# Patient Record
Sex: FEMALE | Race: WHITE | NOT HISPANIC OR LATINO | URBAN - METROPOLITAN AREA
[De-identification: names, ages, dates, MRNs, and addresses within clinical notes are randomized per-mention and may not be internally consistent; named-entity substitution may affect disease eponyms.]

---

## 2017-04-28 ENCOUNTER — OUTPATIENT (OUTPATIENT)
Dept: OUTPATIENT SERVICES | Facility: HOSPITAL | Age: 82
LOS: 1 days | End: 2017-04-28

## 2017-11-10 ENCOUNTER — OUTPATIENT (OUTPATIENT)
Dept: OUTPATIENT SERVICES | Facility: HOSPITAL | Age: 82
LOS: 1 days | End: 2017-11-10

## 2018-05-25 ENCOUNTER — OUTPATIENT (OUTPATIENT)
Dept: OUTPATIENT SERVICES | Facility: HOSPITAL | Age: 83
LOS: 1 days | End: 2018-05-25

## 2018-07-09 ENCOUNTER — OUTPATIENT (OUTPATIENT)
Dept: OUTPATIENT SERVICES | Facility: HOSPITAL | Age: 83
LOS: 1 days | End: 2018-07-09

## 2018-09-08 PROBLEM — Z00.00 ENCOUNTER FOR PREVENTIVE HEALTH EXAMINATION: Status: ACTIVE | Noted: 2018-09-08

## 2018-10-08 ENCOUNTER — APPOINTMENT (OUTPATIENT)
Dept: CARDIOLOGY | Facility: CLINIC | Age: 83
End: 2018-10-08
Payer: MEDICARE

## 2018-10-08 VITALS — DIASTOLIC BLOOD PRESSURE: 98 MMHG | OXYGEN SATURATION: 95 % | HEART RATE: 81 BPM | SYSTOLIC BLOOD PRESSURE: 132 MMHG

## 2018-10-08 DIAGNOSIS — Z82.49 FAMILY HISTORY OF ISCHEMIC HEART DISEASE AND OTHER DISEASES OF THE CIRCULATORY SYSTEM: ICD-10-CM

## 2018-10-08 DIAGNOSIS — Z78.9 OTHER SPECIFIED HEALTH STATUS: ICD-10-CM

## 2018-10-08 PROCEDURE — 99205 OFFICE O/P NEW HI 60 MIN: CPT

## 2018-10-08 PROCEDURE — 93000 ELECTROCARDIOGRAM COMPLETE: CPT

## 2018-10-08 RX ORDER — PRAVASTATIN SODIUM 40 MG/1
40 TABLET ORAL
Refills: 0 | Status: ACTIVE | COMMUNITY

## 2018-10-08 RX ORDER — FLUTICASONE PROPIONATE 50 UG/1
50 SPRAY, METERED NASAL
Refills: 0 | Status: ACTIVE | COMMUNITY

## 2018-10-12 PROCEDURE — 93224 XTRNL ECG REC UP TO 48 HRS: CPT

## 2018-10-29 ENCOUNTER — APPOINTMENT (OUTPATIENT)
Dept: CARDIOLOGY | Facility: CLINIC | Age: 83
End: 2018-10-29
Payer: MEDICARE

## 2018-10-29 PROCEDURE — 93015 CV STRESS TEST SUPVJ I&R: CPT

## 2018-10-29 PROCEDURE — A9502: CPT

## 2018-10-29 PROCEDURE — 78452 HT MUSCLE IMAGE SPECT MULT: CPT

## 2018-10-29 PROCEDURE — 93306 TTE W/DOPPLER COMPLETE: CPT

## 2018-11-05 ENCOUNTER — APPOINTMENT (OUTPATIENT)
Dept: CARDIOLOGY | Facility: CLINIC | Age: 83
End: 2018-11-05
Payer: MEDICARE

## 2018-11-05 VITALS
BODY MASS INDEX: 22.33 KG/M2 | SYSTOLIC BLOOD PRESSURE: 148 MMHG | DIASTOLIC BLOOD PRESSURE: 88 MMHG | HEIGHT: 65 IN | WEIGHT: 134 LBS

## 2018-11-05 PROCEDURE — 99214 OFFICE O/P EST MOD 30 MIN: CPT

## 2018-12-20 ENCOUNTER — OUTPATIENT (OUTPATIENT)
Dept: OUTPATIENT SERVICES | Facility: HOSPITAL | Age: 83
LOS: 1 days | End: 2018-12-20

## 2019-02-23 ENCOUNTER — TRANSCRIPTION ENCOUNTER (OUTPATIENT)
Age: 84
End: 2019-02-23

## 2019-05-06 ENCOUNTER — APPOINTMENT (OUTPATIENT)
Dept: CARDIOLOGY | Facility: CLINIC | Age: 84
End: 2019-05-06

## 2019-06-19 ENCOUNTER — OUTPATIENT (OUTPATIENT)
Dept: OUTPATIENT SERVICES | Facility: HOSPITAL | Age: 84
LOS: 1 days | End: 2019-06-19

## 2019-06-21 ENCOUNTER — APPOINTMENT (OUTPATIENT)
Dept: RADIOLOGY | Facility: CLINIC | Age: 84
End: 2019-06-21

## 2019-06-21 ENCOUNTER — APPOINTMENT (OUTPATIENT)
Dept: MAMMOGRAPHY | Facility: CLINIC | Age: 84
End: 2019-06-21
Payer: MEDICARE

## 2019-06-21 PROCEDURE — 77063 BREAST TOMOSYNTHESIS BI: CPT

## 2019-06-21 PROCEDURE — 77080 DXA BONE DENSITY AXIAL: CPT

## 2019-06-21 PROCEDURE — 77067 SCR MAMMO BI INCL CAD: CPT

## 2019-09-22 ENCOUNTER — TRANSCRIPTION ENCOUNTER (OUTPATIENT)
Age: 84
End: 2019-09-22

## 2019-10-11 ENCOUNTER — OUTPATIENT (OUTPATIENT)
Dept: OUTPATIENT SERVICES | Facility: HOSPITAL | Age: 84
LOS: 1 days | End: 2019-10-11

## 2020-01-23 ENCOUNTER — TRANSCRIPTION ENCOUNTER (OUTPATIENT)
Age: 85
End: 2020-01-23

## 2020-02-05 ENCOUNTER — OUTPATIENT (OUTPATIENT)
Dept: OUTPATIENT SERVICES | Facility: HOSPITAL | Age: 85
LOS: 1 days | End: 2020-02-05

## 2020-09-25 ENCOUNTER — TRANSCRIPTION ENCOUNTER (OUTPATIENT)
Age: 85
End: 2020-09-25

## 2020-10-09 ENCOUNTER — OUTPATIENT (OUTPATIENT)
Dept: OUTPATIENT SERVICES | Facility: HOSPITAL | Age: 85
LOS: 1 days | End: 2020-10-09

## 2021-05-07 ENCOUNTER — OUTPATIENT (OUTPATIENT)
Dept: OUTPATIENT SERVICES | Facility: HOSPITAL | Age: 86
LOS: 1 days | End: 2021-05-07

## 2021-11-05 ENCOUNTER — OUTPATIENT (OUTPATIENT)
Dept: OUTPATIENT SERVICES | Facility: HOSPITAL | Age: 86
LOS: 1 days | End: 2021-11-05

## 2021-12-14 ENCOUNTER — OUTPATIENT (OUTPATIENT)
Dept: OUTPATIENT SERVICES | Facility: HOSPITAL | Age: 86
LOS: 1 days | End: 2021-12-14

## 2022-02-10 ENCOUNTER — NON-APPOINTMENT (OUTPATIENT)
Age: 87
End: 2022-02-10

## 2022-02-10 ENCOUNTER — APPOINTMENT (OUTPATIENT)
Dept: OPHTHALMOLOGY | Facility: CLINIC | Age: 87
End: 2022-02-10
Payer: MEDICARE

## 2022-02-10 PROCEDURE — 92014 COMPRE OPH EXAM EST PT 1/>: CPT

## 2022-02-10 PROCEDURE — 92134 CPTRZ OPH DX IMG PST SGM RTA: CPT

## 2022-05-07 ENCOUNTER — INPATIENT (INPATIENT)
Facility: HOSPITAL | Age: 87
LOS: 5 days | Discharge: HOME | DRG: 964 | End: 2022-05-13
Attending: HOSPITALIST | Admitting: HOSPITALIST
Payer: MEDICARE

## 2022-05-07 ENCOUNTER — EMERGENCY (EMERGENCY)
Facility: HOSPITAL | Age: 87
LOS: 1 days | Discharge: HOME | End: 2022-05-07
Admitting: EMERGENCY MEDICINE
Payer: MEDICARE

## 2022-05-07 VITALS
DIASTOLIC BLOOD PRESSURE: 60 MMHG | HEART RATE: 91 BPM | SYSTOLIC BLOOD PRESSURE: 150 MMHG | TEMPERATURE: 97 F | RESPIRATION RATE: 18 BRPM

## 2022-05-07 DIAGNOSIS — G93.41 METABOLIC ENCEPHALOPATHY: ICD-10-CM

## 2022-05-07 DIAGNOSIS — Z98.890 OTHER SPECIFIED POSTPROCEDURAL STATES: Chronic | ICD-10-CM

## 2022-05-07 DIAGNOSIS — Z96.659 PRESENCE OF UNSPECIFIED ARTIFICIAL KNEE JOINT: Chronic | ICD-10-CM

## 2022-05-07 LAB
ALBUMIN SERPL ELPH-MCNC: 4.2 G/DL — SIGNIFICANT CHANGE UP (ref 3.3–5.2)
ALP SERPL-CCNC: 81 U/L — SIGNIFICANT CHANGE UP (ref 40–120)
ALT FLD-CCNC: 19 U/L — SIGNIFICANT CHANGE UP
AMPHET UR-MCNC: NEGATIVE — SIGNIFICANT CHANGE UP
ANION GAP SERPL CALC-SCNC: 13 MMOL/L — SIGNIFICANT CHANGE UP (ref 5–17)
APTT BLD: 27.7 SEC — SIGNIFICANT CHANGE UP (ref 27.5–35.5)
AST SERPL-CCNC: 24 U/L — SIGNIFICANT CHANGE UP
BARBITURATES UR SCN-MCNC: NEGATIVE — SIGNIFICANT CHANGE UP
BASOPHILS # BLD AUTO: 0.05 K/UL — SIGNIFICANT CHANGE UP (ref 0–0.2)
BASOPHILS NFR BLD AUTO: 0.5 % — SIGNIFICANT CHANGE UP (ref 0–2)
BENZODIAZ UR-MCNC: NEGATIVE — SIGNIFICANT CHANGE UP
BILIRUB SERPL-MCNC: 0.3 MG/DL — LOW (ref 0.4–2)
BLD GP AB SCN SERPL QL: SIGNIFICANT CHANGE UP
BUN SERPL-MCNC: 19.7 MG/DL — SIGNIFICANT CHANGE UP (ref 8–20)
CALCIUM SERPL-MCNC: 9.4 MG/DL — SIGNIFICANT CHANGE UP (ref 8.6–10.2)
CHLORIDE SERPL-SCNC: 108 MMOL/L — HIGH (ref 98–107)
CO2 SERPL-SCNC: 21 MMOL/L — LOW (ref 22–29)
COCAINE METAB.OTHER UR-MCNC: NEGATIVE — SIGNIFICANT CHANGE UP
CREAT SERPL-MCNC: 0.63 MG/DL — SIGNIFICANT CHANGE UP (ref 0.5–1.3)
EGFR: 87 ML/MIN/1.73M2 — SIGNIFICANT CHANGE UP
EOSINOPHIL # BLD AUTO: 0.01 K/UL — SIGNIFICANT CHANGE UP (ref 0–0.5)
EOSINOPHIL NFR BLD AUTO: 0.1 % — SIGNIFICANT CHANGE UP (ref 0–6)
ETHANOL SERPL-MCNC: <10 MG/DL — SIGNIFICANT CHANGE UP (ref 0–9)
GLUCOSE SERPL-MCNC: 181 MG/DL — HIGH (ref 70–99)
HCT VFR BLD CALC: 48.1 % — HIGH (ref 34.5–45)
HGB BLD-MCNC: 15 G/DL — SIGNIFICANT CHANGE UP (ref 11.5–15.5)
IMM GRANULOCYTES NFR BLD AUTO: 0.4 % — SIGNIFICANT CHANGE UP (ref 0–1.5)
LYMPHOCYTES # BLD AUTO: 0.62 K/UL — LOW (ref 1–3.3)
LYMPHOCYTES # BLD AUTO: 5.9 % — LOW (ref 13–44)
MCHC RBC-ENTMCNC: 27.6 PG — SIGNIFICANT CHANGE UP (ref 27–34)
MCHC RBC-ENTMCNC: 31.2 GM/DL — LOW (ref 32–36)
MCV RBC AUTO: 88.6 FL — SIGNIFICANT CHANGE UP (ref 80–100)
METHADONE UR-MCNC: NEGATIVE — SIGNIFICANT CHANGE UP
MONOCYTES # BLD AUTO: 0.32 K/UL — SIGNIFICANT CHANGE UP (ref 0–0.9)
MONOCYTES NFR BLD AUTO: 3 % — SIGNIFICANT CHANGE UP (ref 2–14)
NEUTROPHILS # BLD AUTO: 9.48 K/UL — HIGH (ref 1.8–7.4)
NEUTROPHILS NFR BLD AUTO: 90.1 % — HIGH (ref 43–77)
OPIATES UR-MCNC: NEGATIVE — SIGNIFICANT CHANGE UP
PCP SPEC-MCNC: SIGNIFICANT CHANGE UP
PCP UR-MCNC: NEGATIVE — SIGNIFICANT CHANGE UP
PLATELET # BLD AUTO: 194 K/UL — SIGNIFICANT CHANGE UP (ref 150–400)
POTASSIUM SERPL-MCNC: 3.8 MMOL/L — SIGNIFICANT CHANGE UP (ref 3.5–5.3)
POTASSIUM SERPL-SCNC: 3.8 MMOL/L — SIGNIFICANT CHANGE UP (ref 3.5–5.3)
PROT SERPL-MCNC: 7 G/DL — SIGNIFICANT CHANGE UP (ref 6.6–8.7)
RBC # BLD: 5.43 M/UL — HIGH (ref 3.8–5.2)
RBC # FLD: 13 % — SIGNIFICANT CHANGE UP (ref 10.3–14.5)
SODIUM SERPL-SCNC: 142 MMOL/L — SIGNIFICANT CHANGE UP (ref 135–145)
THC UR QL: NEGATIVE — SIGNIFICANT CHANGE UP
WBC # BLD: 10.52 K/UL — HIGH (ref 3.8–10.5)
WBC # FLD AUTO: 10.52 K/UL — HIGH (ref 3.8–10.5)

## 2022-05-07 PROCEDURE — 72125 CT NECK SPINE W/O DYE: CPT | Mod: 26,MH

## 2022-05-07 PROCEDURE — 93010 ELECTROCARDIOGRAM REPORT: CPT

## 2022-05-07 PROCEDURE — 71260 CT THORAX DX C+: CPT | Mod: 26,MA

## 2022-05-07 PROCEDURE — 70450 CT HEAD/BRAIN W/O DYE: CPT | Mod: 26,MH

## 2022-05-07 PROCEDURE — 0042T: CPT

## 2022-05-07 PROCEDURE — 70486 CT MAXILLOFACIAL W/O DYE: CPT | Mod: 26,MH

## 2022-05-07 PROCEDURE — 74177 CT ABD & PELVIS W/CONTRAST: CPT | Mod: 26,MA

## 2022-05-07 PROCEDURE — 71045 X-RAY EXAM CHEST 1 VIEW: CPT | Mod: 26

## 2022-05-07 PROCEDURE — 70498 CT ANGIOGRAPHY NECK: CPT | Mod: 26,MG

## 2022-05-07 PROCEDURE — 99285 EMERGENCY DEPT VISIT HI MDM: CPT | Mod: FS

## 2022-05-07 PROCEDURE — 99291 CRITICAL CARE FIRST HOUR: CPT

## 2022-05-07 PROCEDURE — 70496 CT ANGIOGRAPHY HEAD: CPT | Mod: 26,ME

## 2022-05-07 PROCEDURE — G1004: CPT

## 2022-05-07 RX ORDER — ONDANSETRON 8 MG/1
4 TABLET, FILM COATED ORAL EVERY 6 HOURS
Refills: 0 | Status: DISCONTINUED | OUTPATIENT
Start: 2022-05-07 | End: 2022-05-13

## 2022-05-07 RX ORDER — SODIUM CHLORIDE 9 MG/ML
1000 INJECTION INTRAMUSCULAR; INTRAVENOUS; SUBCUTANEOUS
Refills: 0 | Status: DISCONTINUED | OUTPATIENT
Start: 2022-05-07 | End: 2022-05-08

## 2022-05-07 RX ORDER — ACETAMINOPHEN 500 MG
1000 TABLET ORAL ONCE
Refills: 0 | Status: COMPLETED | OUTPATIENT
Start: 2022-05-07 | End: 2022-05-07

## 2022-05-07 RX ORDER — LABETALOL HCL 100 MG
10 TABLET ORAL EVERY 6 HOURS
Refills: 0 | Status: DISCONTINUED | OUTPATIENT
Start: 2022-05-07 | End: 2022-05-08

## 2022-05-07 RX ORDER — HYDRALAZINE HCL 50 MG
10 TABLET ORAL EVERY 6 HOURS
Refills: 0 | Status: DISCONTINUED | OUTPATIENT
Start: 2022-05-07 | End: 2022-05-08

## 2022-05-07 RX ORDER — NICARDIPINE HYDROCHLORIDE 30 MG/1
5 CAPSULE, EXTENDED RELEASE ORAL
Qty: 40 | Refills: 0 | Status: DISCONTINUED | OUTPATIENT
Start: 2022-05-07 | End: 2022-05-08

## 2022-05-07 RX ADMIN — Medication 400 MILLIGRAM(S): at 17:22

## 2022-05-07 RX ADMIN — SODIUM CHLORIDE 75 MILLILITER(S): 9 INJECTION INTRAMUSCULAR; INTRAVENOUS; SUBCUTANEOUS at 19:38

## 2022-05-07 RX ADMIN — NICARDIPINE HYDROCHLORIDE 25 MG/HR: 30 CAPSULE, EXTENDED RELEASE ORAL at 17:23

## 2022-05-07 NOTE — ED PROVIDER NOTE - CLINICAL SUMMARY MEDICAL DECISION MAKING FREE TEXT BOX
labs and imaging reviewed. Pt cleared by trauma.  neurosurgery recommends CTA/P which was ordered.  will admit to neurosurgery for further management.

## 2022-05-07 NOTE — ED ADULT NURSE REASSESSMENT NOTE - NS ED NURSE REASSESS COMMENT FT1
Report received from CC AJAY Meyers. Pt received on cardene gtt @ 5mcg. Cardene gtt paused @ this time, see BP in flow sheet. Pt presents with R eye ecchymosis w/ blood to the slcera. Pt c/o slight HA @ this time. Denies of HA, CP, SOB, dizziness, lightheadedness, NVD. Respirations even & unlabored. NAD present. Pt remains on CM in NSR w/. Pt received with 16 fr Indwelling garcia catheter. Output of 300cc when pt received. Pt awaiting bed @ this time. Pt made aware of plan of care and verbalized understanding.

## 2022-05-07 NOTE — ED ADULT NURSE NOTE - OBJECTIVE STATEMENT
transfer from [facility] [place]  assumed care of pt in trauma bay. see trauma flow sheet.

## 2022-05-07 NOTE — ED ADULT NURSE REASSESSMENT NOTE - NS ED NURSE REASSESS COMMENT FT1
Bedside  ABD US done by Dr. Pineda > 400 ml urine in the bladder . Neuro ICU PA called and made aware - awaiting additional orders - will continue to monitor

## 2022-05-07 NOTE — H&P ADULT - HISTORY OF PRESENT ILLNESS
HPI: Patient is a 86y old  Female with no PMH transferred from [facility] [place] after fall while pushing a chair with + LOC and HS and evidence of SDH. On Arrival patient alert and oriented, responding appropriately without complaints.   Reports living alone and walks without assistance, takes no medications.     Primary Survey:    A - airway intact  B - bilateral breath sounds and good chest rise  C - palpable pulses in all extremities  D - GCS 15 on arrival  Exposure obtained    CXR: No acute traumatic injury MRs: 0  NIH 1    Patient is a 86y old  Female who presents with a chief complaint of a fall    HPI:  85 yo female with a pmhx of HTN and HGLD presents as a transfer from [facility] [place] with reports of a fall while at [place] pushing a chair. Patient states that the last thing she remembers is trying to move the chair and then she woke up on the floor. She states prior to the fall she felt fine, denied any headaches, chest pain or shortness of breathe. (+)HS  (+) LOC. Patient reports a mild headache, trouble speaking but denies any neck pain, numbness, tingling or weakness in her extremities.     Of note son at bedside also reporting a change in patients speech    PAST MEDICAL & SURGICAL HISTORY:  HTN    HLD    H/O hand surgery    H/o of Left knee replacement    SOCIAL HISTORY:  Tobacco Use: Denies   EtOH use: Denies  Substance: Denies    Allergies    No Known Allergies    Intolerances    Vital Signs Last 24 Hrs  T(C): 36.1 (07 May 2022 17:20), Max: 36.1 (07 May 2022 17:20)  T(F): 97 (07 May 2022 17:20), Max: 97 (07 May 2022 17:20)  HR: 78 (07 May 2022 18:01) (78 - 91)  BP: 128/65 (07 May 2022 18:01) (128/65 - 150/60)  BP(mean): --  RR: 18 (07 May 2022 18:01) (18 - 18)  SpO2: 98% (07 May 2022 18:01) (98% - 98%)

## 2022-05-07 NOTE — H&P ADULT - NSHPLABSRESULTS_GEN_ALL_CORE
LABS:                        15.0   10.52 )-----------( 194      ( 07 May 2022 17:09 )             48.1     05-07    142  |  108<H>  |  19.7  ----------------------------<  181<H>  3.8   |  21.0<L>  |  0.63    Ca    9.4      07 May 2022 17:09    TPro  7.0  /  Alb  4.2  /  TBili  0.3<L>  /  DBili  x   /  AST  24  /  ALT  19  /  AlkPhos  81  05-07    PTT - ( 07 May 2022 17:09 )  PTT:27.7 sec      RADIOLOGY & ADDITIONAL STUDIES:    5/7 CT Head at [place]: Right Parietal Extracalvarial hematoma, No fracture of the underlying calvarium. Small left frontal and left tentorial Subdural hematoma. Left Posterior Fossa hygroma. Minimal fluid within the inferiorly located mastoid air cells on the right  5/7 CT C-Spine: No acture. Grade 1 Subluxation of C6-C7with aterolisthesis of C6 on C7    CAPRINI SCORE [CLOT]:  Patient has an estimated Caprini score of greater than 5.  However, the patient's unique clinical situation will be addressed in an individual manner to determine appropriate anticoagulation treatment, if any

## 2022-05-07 NOTE — ED ADULT NURSE REASSESSMENT NOTE - NS ED NURSE REASSESS COMMENT FT1
pt started on cardene drip upon trauma activation. cardene at 5 mg/hr. pt anox3. chronic deformity noted on right elbow. right eye is swollen and bruised, unable to assess pupils in right eye. pt c/o 10/10 sharp HA on right side. md vasquez made aware. medication administered as per orders. rr even and unlabored on RA. placed on cardiac monitor and prima fit in place. pt also c/o of abdominal pain. abdomen is distended and tender, icu pa made aware.  juliane villeda at bedside for evaluation.

## 2022-05-07 NOTE — ED PROVIDER NOTE - NS ED ROS FT
No fever/chills   No photophobia/eye pain/changes in visio,   No ear pain/sore throat/dysphagia   No chest pain/palpitations  No SOB/cough/wheeze/stridor   No abdominal pain, No N/V/D  No dysuria/frequency/discharge   No neck/back pain,   No rash  No changes in neurological status/function.

## 2022-05-07 NOTE — H&P ADULT - NSICDXPASTSURGICALHX_GEN_ALL_CORE_FT
PAST SURGICAL HISTORY:  H/O hand surgery      PAST SURGICAL HISTORY:  H/O hand surgery       S/P knee replacement

## 2022-05-07 NOTE — ED ADULT TRIAGE NOTE - CHIEF COMPLAINT QUOTE
Pt arrives awake alert and oriented from [facility] [place] for SDH s/p trip and fall at home. Pt at baseline mentation and has Cardene infusion running. Trauma B initiated.

## 2022-05-07 NOTE — ED PROVIDER NOTE - PHYSICAL EXAMINATION
Constitutional - well-developed.   Head - R periorbital hematoma. Airway patent.   Eyes - PERRL.   CV - RRR. no murmur. no edema.   Pulm - CTAB.   Abd - soft, nt. no rebound. no guarding.   Neuro - A&Ox3. strength 5/5 x4. sensation intact x4. normal gait.   Skin - No rash. .  MSK - normal ROM.

## 2022-05-07 NOTE — H&P ADULT - NSICDXPASTMEDICALHX_GEN_ALL_CORE_FT
PAST MEDICAL HISTORY:  No pertinent past medical history PAST MEDICAL HISTORY:  HLD (hyperlipidemia)     HTN (hypertension)

## 2022-05-07 NOTE — H&P ADULT - NSHPPHYSICALEXAM_GEN_ALL_CORE
PHYSICAL EXAM:   General: NAD, Lying in bed comfortably  Neuro: A+Ox3  HEENT: R posterior scalp hematoma, R periorbital ecchymosis, EOMI, R eye conjunctival injection, midface stable.   Neck: Soft, supple  Cardio: RRR, nml S1/S2  Resp: Good effort, CTA b/l  Thorax: No chest wall tenderness  Breast: No lesions/masses, no drainage  GI/Abd: Soft, NT/ND, no rebound/guarding, no masses palpated  Vascular: All 4 extremities warm.  Skin: Intact, no breakdown  Pelvis: stable  Musculoskeletal: All 4 extremities moving spontaneously, no limitations, no cervical-thoracic spinal tenderness or stepoffs. Lumbar spine/paraspinal tenderness PHYSICAL EXAM:  GENERAL: NAD, well-groomed, well-developed  HEAD:  (+) Soft tissue swelling to the right temporparietal area   EYES: (+) Right eye ree-orbital ecchymosis, (+) Conjuctival hemorrhage, Pupils 3mmR, EOMI  ENMT: No tonsillar erythema, exudates, or enlargement; moist mucous membranes, good dentition, no lesions  NECK: Supple, No tenderness to palpation, (+) Pain with cervical bending and lateral rotation to the right  MENTAL STATUS: AAO x3; Awake, Opens eyes spontaneously, Following simple commands, Mild expressive aphasia and word finding difficulty  CRANIAL NERVES: PERRL. EOMI without nystagmus. No signs of entrapment, Facial sensation intact V1-3 distribution b/l. Face symmetric w/ normal eye closure and smile, tongue midline. Speech clear  REFLEXES: PERRL  MOTOR: strength 5/5 b/l upper and lower extremities  Uppers     Delt (C5/6)     Bicep (C5/6)     Wrist Extend (C6)     Tricep (C7)     HG (C8/T1)  R                     5/5                 5/5                         5/5                           5/5                   5/5  L                      5/5                 5/5                         5/5                           5/5                   5/5  Lowers      HF(L1/L2)     KE (L3)     DF (L4)     EHL (L5)     PF (S1)      R                     5/5              5/5           5/5           5/5            5/5  L                     5/5               5/5          5/5            5/5            5/5  SENSATION: grossly intact to light touch all extremities  MUSCLE STRETCH REFLEXES: DTRs 2+ intact and symmetric, No tenderness to palpation of spinous processes of the cervical, thoracic and lumbar spine  CHEST/LUNG: Clear to auscultation bilaterally; no rales, rhonchi, wheezing, or rubs  HEART: +S1/+S2  ABDOMEN: Soft, nontender, Mildly distended  EXTREMITIES:  2+ peripheral pulses  LYMPH: No lymphadenopathy noted  SKIN: Warm, dry; no rashes or lesions

## 2022-05-07 NOTE — ED ADULT NURSE REASSESSMENT NOTE - NS ED NURSE REASSESS COMMENT FT1
report  given to ben lee at 17:55. rr even and unlabored. continued cardiac monitor in place. pt educated on plan of care, pt able to successfully teach back plan of care to RN, RN will continue to reeducate pt during hospital stay.

## 2022-05-07 NOTE — H&P ADULT - NSHPREVIEWOFSYSTEMS_GEN_ALL_CORE
REVIEW OF SYSTEMS  Negative except as noted in HPI  CONSTITUTIONAL: No fever, weight loss, or fatigue  EYES: No eye pain, visual disturbances, or discharge  ENMT:  No difficulty hearing, tinnitus, vertigo; No sinus or throat pain  NECK: No pain or stiffness  BREASTS: No pain, masses, or nipple discharge  RESPIRATORY: No cough, wheezing, chills or hemoptysis; No shortness of breath  CARDIOVASCULAR: No chest pain, palpitations, dizziness, or leg swelling  GASTROINTESTINAL: No abdominal or epigastric pain. No nausea, vomiting, or hematemesis; No diarrhea or constipation. No melena or hematochezia.  GENITOURINARY: No dysuria, frequency, hematuria, or incontinence  NEUROLOGICAL: (+) Mild headache, memory loss, and difficulty speaking but denies loss of strength, numbness, or tremors  SKIN: No itching, burning, rashes, or lesions   LYMPH NODES: No enlarged glands  ENDOCRINE: No heat or cold intolerance; No hair loss  MUSCULOSKELETAL: No joint pain or swelling; No muscle, back, or extremity pain  PSYCHIATRIC: No depression, anxiety, mood swings, or difficulty sleeping  HEME/LYMPH: No easy bruising, or bleeding gums  ALLERGY AND IMMUNOLOGIC: No hives or eczema

## 2022-05-07 NOTE — H&P ADULT - ATTENDING COMMENTS
87 yo female with a pmhx of HTN and HLD who had a GLF and was found to have a Left frontal and left tentorial SDH and Grade 1 subluxation of C6/7  Awake alert  hemodynamic intact, slight HTN  Bilateral BS  Abdomen soft  Plan:    - Place C-Collar  - Admit to Neurosurgery Dr. Cartagena with Q2 hours neurochecks  -? New expressive aphasia: CTA head and Neck and CTP  - MRI C-Spine  -Case and plan discussed with Dr. Cartagena attending on call

## 2022-05-07 NOTE — ED ADULT NURSE NOTE - CHIEF COMPLAINT QUOTE
Pt arrives awake alert and oriented from [facility] [place] for [place]H s/p trip and fall at home. Pt at baseline mentation and has Cardene infusion running. Trauma B initiated.

## 2022-05-07 NOTE — H&P ADULT - ASSESSMENT
ASSESSMENT: Patient is a 87 y/o F  s/p GLF with LOC and HS and SDH. Patient also complaints of lumbar tenderness.    PLAN:    - C collar cleared clinically and with imaging from [facility]  - f/u NSx  - NPO  - BP control  - pain control  - strict bedrest  - strict I/Os  - F/U CT CAP, if negative for traumatic injuries, patinet would be cleared for admission to Neurosurgery  - Patient seen/examined with attending.  - Plan to be discussed with Attending, Dr. Keating 85 yo female with a pmhx of HTN and HLD who had a GLF and was found to have a Left frontal and left tentorial SDH and Grade 1 subluxation of C6/7    Plan:    - Place C-Collar  - Admit to Neurosurgery Dr. Cartagena with Q2 hours neurochecks  -? New expressive aphasia: CTA head and Neck and CTP  - MRI C-Spine  -Case and plan discussed with Dr. Cartagena attending on call

## 2022-05-08 DIAGNOSIS — I62.9 NONTRAUMATIC INTRACRANIAL HEMORRHAGE, UNSPECIFIED: ICD-10-CM

## 2022-05-08 DIAGNOSIS — I10 ESSENTIAL (PRIMARY) HYPERTENSION: ICD-10-CM

## 2022-05-08 DIAGNOSIS — E78.5 HYPERLIPIDEMIA, UNSPECIFIED: ICD-10-CM

## 2022-05-08 DIAGNOSIS — R55 SYNCOPE AND COLLAPSE: ICD-10-CM

## 2022-05-08 LAB
A1C WITH ESTIMATED AVERAGE GLUCOSE RESULT: 6.1 % — HIGH (ref 4–5.6)
ANION GAP SERPL CALC-SCNC: 12 MMOL/L — SIGNIFICANT CHANGE UP (ref 5–17)
BASOPHILS # BLD AUTO: 0 K/UL — SIGNIFICANT CHANGE UP (ref 0–0.2)
BASOPHILS NFR BLD AUTO: 0 % — SIGNIFICANT CHANGE UP (ref 0–2)
BUN SERPL-MCNC: 15.3 MG/DL — SIGNIFICANT CHANGE UP (ref 8–20)
CALCIUM SERPL-MCNC: 8.8 MG/DL — SIGNIFICANT CHANGE UP (ref 8.6–10.2)
CHLORIDE SERPL-SCNC: 112 MMOL/L — HIGH (ref 98–107)
CHOLEST SERPL-MCNC: 135 MG/DL — SIGNIFICANT CHANGE UP
CO2 SERPL-SCNC: 21 MMOL/L — LOW (ref 22–29)
CREAT SERPL-MCNC: 0.51 MG/DL — SIGNIFICANT CHANGE UP (ref 0.5–1.3)
EGFR: 92 ML/MIN/1.73M2 — SIGNIFICANT CHANGE UP
EOSINOPHIL # BLD AUTO: 0 K/UL — SIGNIFICANT CHANGE UP (ref 0–0.5)
EOSINOPHIL NFR BLD AUTO: 0 % — SIGNIFICANT CHANGE UP (ref 0–6)
ESTIMATED AVERAGE GLUCOSE: 128 MG/DL — HIGH (ref 68–114)
GIANT PLATELETS BLD QL SMEAR: PRESENT — SIGNIFICANT CHANGE UP
GLUCOSE BLDC GLUCOMTR-MCNC: 113 MG/DL — HIGH (ref 70–99)
GLUCOSE BLDC GLUCOMTR-MCNC: 119 MG/DL — HIGH (ref 70–99)
GLUCOSE BLDC GLUCOMTR-MCNC: 123 MG/DL — HIGH (ref 70–99)
GLUCOSE BLDC GLUCOMTR-MCNC: 158 MG/DL — HIGH (ref 70–99)
GLUCOSE SERPL-MCNC: 177 MG/DL — HIGH (ref 70–99)
HCT VFR BLD CALC: 40.8 % — SIGNIFICANT CHANGE UP (ref 34.5–45)
HDLC SERPL-MCNC: 51 MG/DL — SIGNIFICANT CHANGE UP
HGB BLD-MCNC: 12.9 G/DL — SIGNIFICANT CHANGE UP (ref 11.5–15.5)
INR BLD: 1.09 RATIO — SIGNIFICANT CHANGE UP (ref 0.88–1.16)
LIPID PNL WITH DIRECT LDL SERPL: 72 MG/DL — SIGNIFICANT CHANGE UP
LYMPHOCYTES # BLD AUTO: 0.27 K/UL — LOW (ref 1–3.3)
LYMPHOCYTES # BLD AUTO: 3.5 % — LOW (ref 13–44)
MAGNESIUM SERPL-MCNC: 2 MG/DL — SIGNIFICANT CHANGE UP (ref 1.6–2.6)
MANUAL SMEAR VERIFICATION: SIGNIFICANT CHANGE UP
MCHC RBC-ENTMCNC: 28.4 PG — SIGNIFICANT CHANGE UP (ref 27–34)
MCHC RBC-ENTMCNC: 31.6 GM/DL — LOW (ref 32–36)
MCV RBC AUTO: 89.9 FL — SIGNIFICANT CHANGE UP (ref 80–100)
MONOCYTES # BLD AUTO: 0.66 K/UL — SIGNIFICANT CHANGE UP (ref 0–0.9)
MONOCYTES NFR BLD AUTO: 8.7 % — SIGNIFICANT CHANGE UP (ref 2–14)
NEUTROPHILS # BLD AUTO: 6.7 K/UL — SIGNIFICANT CHANGE UP (ref 1.8–7.4)
NEUTROPHILS NFR BLD AUTO: 86.9 % — HIGH (ref 43–77)
NEUTS BAND # BLD: 0.9 % — SIGNIFICANT CHANGE UP (ref 0–8)
NON HDL CHOLESTEROL: 84 MG/DL — SIGNIFICANT CHANGE UP
PHOSPHATE SERPL-MCNC: 2.8 MG/DL — SIGNIFICANT CHANGE UP (ref 2.4–4.7)
PLAT MORPH BLD: NORMAL — SIGNIFICANT CHANGE UP
PLATELET # BLD AUTO: 195 K/UL — SIGNIFICANT CHANGE UP (ref 150–400)
POTASSIUM SERPL-MCNC: 3.8 MMOL/L — SIGNIFICANT CHANGE UP (ref 3.5–5.3)
POTASSIUM SERPL-SCNC: 3.8 MMOL/L — SIGNIFICANT CHANGE UP (ref 3.5–5.3)
PROTHROM AB SERPL-ACNC: 12.7 SEC — SIGNIFICANT CHANGE UP (ref 10.5–13.4)
RBC # BLD: 4.54 M/UL — SIGNIFICANT CHANGE UP (ref 3.8–5.2)
RBC # FLD: 13.2 % — SIGNIFICANT CHANGE UP (ref 10.3–14.5)
RBC BLD AUTO: NORMAL — SIGNIFICANT CHANGE UP
SARS-COV-2 RNA SPEC QL NAA+PROBE: SIGNIFICANT CHANGE UP
SODIUM SERPL-SCNC: 145 MMOL/L — SIGNIFICANT CHANGE UP (ref 135–145)
TRIGL SERPL-MCNC: 60 MG/DL — SIGNIFICANT CHANGE UP
TROPONIN T SERPL-MCNC: <0.01 NG/ML — SIGNIFICANT CHANGE UP (ref 0–0.06)
TSH SERPL-MCNC: 0.42 UIU/ML — SIGNIFICANT CHANGE UP (ref 0.27–4.2)
WBC # BLD: 7.63 K/UL — SIGNIFICANT CHANGE UP (ref 3.8–10.5)
WBC # FLD AUTO: 7.63 K/UL — SIGNIFICANT CHANGE UP (ref 3.8–10.5)

## 2022-05-08 PROCEDURE — 70450 CT HEAD/BRAIN W/O DYE: CPT | Mod: 26

## 2022-05-08 PROCEDURE — 93306 TTE W/DOPPLER COMPLETE: CPT | Mod: 26

## 2022-05-08 PROCEDURE — 93010 ELECTROCARDIOGRAM REPORT: CPT

## 2022-05-08 PROCEDURE — 99233 SBSQ HOSP IP/OBS HIGH 50: CPT

## 2022-05-08 PROCEDURE — 99497 ADVNCD CARE PLAN 30 MIN: CPT

## 2022-05-08 PROCEDURE — 99291 CRITICAL CARE FIRST HOUR: CPT

## 2022-05-08 PROCEDURE — 99222 1ST HOSP IP/OBS MODERATE 55: CPT

## 2022-05-08 RX ORDER — HYDRALAZINE HCL 50 MG
10 TABLET ORAL EVERY 6 HOURS
Refills: 0 | Status: DISCONTINUED | OUTPATIENT
Start: 2022-05-08 | End: 2022-05-13

## 2022-05-08 RX ORDER — ATORVASTATIN CALCIUM 80 MG/1
10 TABLET, FILM COATED ORAL AT BEDTIME
Refills: 0 | Status: DISCONTINUED | OUTPATIENT
Start: 2022-05-08 | End: 2022-05-13

## 2022-05-08 RX ORDER — LEVETIRACETAM 250 MG/1
500 TABLET, FILM COATED ORAL EVERY 12 HOURS
Refills: 0 | Status: DISCONTINUED | OUTPATIENT
Start: 2022-05-08 | End: 2022-05-13

## 2022-05-08 RX ORDER — LABETALOL HCL 100 MG
10 TABLET ORAL EVERY 6 HOURS
Refills: 0 | Status: DISCONTINUED | OUTPATIENT
Start: 2022-05-08 | End: 2022-05-08

## 2022-05-08 RX ORDER — SODIUM CHLORIDE 9 MG/ML
1000 INJECTION, SOLUTION INTRAVENOUS
Refills: 0 | Status: DISCONTINUED | OUTPATIENT
Start: 2022-05-08 | End: 2022-05-08

## 2022-05-08 RX ORDER — ACETAMINOPHEN 500 MG
650 TABLET ORAL ONCE
Refills: 0 | Status: COMPLETED | OUTPATIENT
Start: 2022-05-08 | End: 2022-05-08

## 2022-05-08 RX ORDER — SODIUM,POTASSIUM PHOSPHATES 278-250MG
1 POWDER IN PACKET (EA) ORAL ONCE
Refills: 0 | Status: COMPLETED | OUTPATIENT
Start: 2022-05-08 | End: 2022-05-08

## 2022-05-08 RX ORDER — DEXTROSE 50 % IN WATER 50 %
15 SYRINGE (ML) INTRAVENOUS ONCE
Refills: 0 | Status: DISCONTINUED | OUTPATIENT
Start: 2022-05-08 | End: 2022-05-13

## 2022-05-08 RX ORDER — DEXTROSE 50 % IN WATER 50 %
25 SYRINGE (ML) INTRAVENOUS ONCE
Refills: 0 | Status: DISCONTINUED | OUTPATIENT
Start: 2022-05-08 | End: 2022-05-13

## 2022-05-08 RX ORDER — GLUCAGON INJECTION, SOLUTION 0.5 MG/.1ML
1 INJECTION, SOLUTION SUBCUTANEOUS ONCE
Refills: 0 | Status: DISCONTINUED | OUTPATIENT
Start: 2022-05-08 | End: 2022-05-13

## 2022-05-08 RX ORDER — INSULIN LISPRO 100/ML
VIAL (ML) SUBCUTANEOUS EVERY 6 HOURS
Refills: 0 | Status: DISCONTINUED | OUTPATIENT
Start: 2022-05-08 | End: 2022-05-12

## 2022-05-08 RX ORDER — LABETALOL HCL 100 MG
10 TABLET ORAL EVERY 6 HOURS
Refills: 0 | Status: DISCONTINUED | OUTPATIENT
Start: 2022-05-08 | End: 2022-05-13

## 2022-05-08 RX ORDER — SODIUM CHLORIDE 9 MG/ML
1000 INJECTION, SOLUTION INTRAVENOUS
Refills: 0 | Status: DISCONTINUED | OUTPATIENT
Start: 2022-05-08 | End: 2022-05-13

## 2022-05-08 RX ADMIN — LEVETIRACETAM 420 MILLIGRAM(S): 250 TABLET, FILM COATED ORAL at 18:28

## 2022-05-08 RX ADMIN — Medication 650 MILLIGRAM(S): at 21:00

## 2022-05-08 RX ADMIN — Medication 1 PACKET(S): at 11:53

## 2022-05-08 RX ADMIN — ATORVASTATIN CALCIUM 10 MILLIGRAM(S): 80 TABLET, FILM COATED ORAL at 21:04

## 2022-05-08 RX ADMIN — Medication 2: at 05:55

## 2022-05-08 RX ADMIN — Medication 650 MILLIGRAM(S): at 19:55

## 2022-05-08 RX ADMIN — LEVETIRACETAM 420 MILLIGRAM(S): 250 TABLET, FILM COATED ORAL at 05:53

## 2022-05-08 NOTE — CHART NOTE - NSCHARTNOTEFT_GEN_A_CORE
Ms. Sarah Costa, who was diagnosed with L frontal hemorrhagic contusion, is being downgraded from the neuro ICU to the medicine service.      HPI:  MRs: 0  NIH 1    Patient is a 86y old  Female who presents with a chief complaint of a fall    HPI:  85 yo female with a pmhx of HTN and HGLD presents as a transfer from [facility] [place] with reports of a fall while at [employer] pushing a chair. Patient states that the last thing she remembers is trying to move the chair and then she woke up on the floor. She states prior to the fall she felt fine, denied any headaches, chest pain or shortness of breathe. (+)HS  (+) LOC. Patient reports a mild headache, trouble speaking but denies any neck pain, numbness, tingling or weakness in her extremities.     Of note son at bedside also reporting a change in patients speech (07 May 2022 17:27)      Hospital course:   5/7: Repeat CTH read as some worsening in small hemorrhagic contusion, admitted to neurosurgery in neuro ICU.   5/8: Third CTH read as stable from second. Exam remains intact. Continues to complain of neck pain when C collar removed, in for MRI C-spine. Stable for downgrade.      ICU Vital Signs Last 24 Hrs  T(C): 36.8 (08 May 2022 11:12), Max: 37.3 (08 May 2022 07:20)  T(F): 98.2 (08 May 2022 11:12), Max: 99.1 (08 May 2022 07:20)  HR: 92 (08 May 2022 12:00) (78 - 102)  BP: 124/80 (08 May 2022 12:00) (107/72 - 150/60)  BP(mean): 91 (08 May 2022 12:00) (73 - 97)  RR: 24 (08 May 2022 12:00) (12 - 25)  SpO2: 96% (08 May 2022 12:00) (92% - 100%)      Labs:                        12.9   7.63  )-----------( 195      ( 08 May 2022 04:48 )             40.8   05-08    145  |  112<H>  |  15.3  ----------------------------<  177<H>  3.8   |  21.0<L>  |  0.51    Ca    8.8      08 May 2022 04:48  Phos  2.8     05-08  Mg     2.0     05-08    TPro  7.0  /  Alb  4.2  /  TBili  0.3<L>  /  DBili  x   /  AST  24  /  ALT  19  /  AlkPhos  81  05-07      Physical exam:   Neuro  * Mental Status:  GCS 15:  E(4), V(5), M(6).  Awake, alert, oriented to conversation, following all commands and conversing appropriately   * Cranial Nerves: Cnii-Cnxii grossly intact. PERRL, EOMI, tongue midline, no gaze deviation  * Motor: RUE 5/5, LUE 5/5, RLE 5/5, LLE 5/5, no drift   * Sensory: Sensation intact to light touch  * Reflexes: Not assessed  * Gait: Not assessed    Cardiovascular:  RRR  Eyes: See neurologic examination with detailed examination of eyes.  ENT: No JVD, Trachea Midline.  Respiratory: Clear to auscultation bilaterally   Gastrointestinal: Soft, nontender, nondistended.  Genitourinary: No pubic tenderness   Musculoskeletal: No muscle wasting noted, (See neurologic assessment for full muscle strength assessment) No pretibial edema appreciated, no appreciable calf tenderness.  Skin:  Right periorbital ecchymosis with mild edema, localized areas of edema in the right occiput        Plan by system  Neuro:   - Q4 hour neuro checks   - Keppra 500 BID for seizure prophylaxis x7 days  - MRI C-spine to r/o c spine fracture / ligament tear     CV:   - SBP goal 100-160  - hydralazine / labetalol PRN  - Atorvastatin 10 per cardio  - TTE performed  - Cardiology consulted for syncope w/u     GI:  - Regular diet    :   - Voiding    Heme:  - SCDs    ID:   - No active issues    Endo:   - A1C 6.1%  - ISS     Attestation:  Patient is stable for downgrade and was accepted by the medicine service, Dr. Layton, on 5/8/22 at 1230. If there are further neuro ICU needs, please re-call [phone]. Ms. Sarah Costa, who was diagnosed with L frontal hemorrhagic contusion, is being downgraded from the neuro ICU to the medicine service.      HPI:  MRs: 0  NIH 1    Patient is a 86y old  Female who presents with a chief complaint of a fall    HPI:  87 yo female with a pmhx of HTN and HGLD presents as a transfer from [facility] [place] with reports of a fall while at [employer] pushing a chair. Patient states that the last thing she remembers is trying to move the chair and then she woke up on the floor. She states prior to the fall she felt fine, denied any headaches, chest pain or shortness of breathe. (+)HS  (+) LOC. Patient reports a mild headache, trouble speaking but denies any neck pain, numbness, tingling or weakness in her extremities.     Of note son at bedside also reporting a change in patients speech (07 May 2022 17:27)      Hospital course:   5/7: Repeat CTH read as some worsening in small hemorrhagic contusion, admitted to neurosurgery in neuro ICU.   5/8: Third CTH read as stable from second. Exam remains intact. Continues to complain of neck pain when C collar removed, in for MRI C-spine. Stable for downgrade.      ICU Vital Signs Last 24 Hrs  T(C): 36.8 (08 May 2022 11:12), Max: 37.3 (08 May 2022 07:20)  T(F): 98.2 (08 May 2022 11:12), Max: 99.1 (08 May 2022 07:20)  HR: 92 (08 May 2022 12:00) (78 - 102)  BP: 124/80 (08 May 2022 12:00) (107/72 - 150/60)  BP(mean): 91 (08 May 2022 12:00) (73 - 97)  RR: 24 (08 May 2022 12:00) (12 - 25)  SpO2: 96% (08 May 2022 12:00) (92% - 100%)      Labs:                        12.9   7.63  )-----------( 195      ( 08 May 2022 04:48 )             40.8   05-08    145  |  112<H>  |  15.3  ----------------------------<  177<H>  3.8   |  21.0<L>  |  0.51    Ca    8.8      08 May 2022 04:48  Phos  2.8     05-08  Mg     2.0     05-08    TPro  7.0  /  Alb  4.2  /  TBili  0.3<L>  /  DBili  x   /  AST  24  /  ALT  19  /  AlkPhos  81  05-07      Physical exam:   Neuro  * Mental Status:  GCS 15:  E(4), V(5), M(6).  Awake, alert, oriented to conversation, following all commands and conversing appropriately   * Cranial Nerves: Cnii-Cnxii grossly intact. PERRL, EOMI, tongue midline, no gaze deviation  * Motor: RUE 5/5, LUE 5/5, RLE 5/5, LLE 5/5, no drift   * Sensory: Sensation intact to light touch  * Reflexes: Not assessed  * Gait: Not assessed    Cardiovascular:  RRR  Eyes: See neurologic examination with detailed examination of eyes.  ENT: No JVD, Trachea Midline.  Respiratory: Clear to auscultation bilaterally   Gastrointestinal: Soft, nontender, nondistended.  Genitourinary: No pubic tenderness   Musculoskeletal: No muscle wasting noted, (See neurologic assessment for full muscle strength assessment) No pretibial edema appreciated, no appreciable calf tenderness.  Skin:  Right periorbital ecchymosis with mild edema, localized areas of edema in the right occiput        Plan by system  Neuro:   - Q4 hour neuro checks   - Keppra 500 BID for seizure prophylaxis x7 days  - MRI C-spine to r/o c spine fracture / ligament tear     CV:   - SBP goal 100-160  - hydralazine / labetalol PRN  - Atorvastatin 10 per cardio  - TTE performed  - Cardiology consulted for syncope w/u     GI:  - Regular diet    :   - Voiding    Heme:  - SCDs  - Lovenox ok to start tomorrow 5/9    ID:   - No active issues    Endo:   - A1C 6.1%  - ISS     Attestation:  Patient is stable for downgrade and was accepted by the medicine service, Dr. Layton, on 5/8/22 at 1230. If there are further neuro ICU needs, please re-call [phone].

## 2022-05-08 NOTE — PATIENT PROFILE ADULT - NSPROMEDSADMININFO_GEN_A_NUR
Magnus Garcia I saw Carmina Castano today with pelvic muscle weakness and abd wall pain. I've recommended pelvic floor PT. I will work to manage her sx. I appreciate the opportunity to participate in her care.  Thanks, [employer] no concerns

## 2022-05-08 NOTE — OCCUPATIONAL THERAPY INITIAL EVALUATION ADULT - GENERAL OBSERVATIONS, REHAB EVAL
Pt received supine in bed, +bilateral VCDs, +IV, +cardiac monitor with , +cervical collar, agreeable to OT eval.

## 2022-05-08 NOTE — OCCUPATIONAL THERAPY INITIAL EVALUATION ADULT - NS ASR OT EQUIP NEEDS DISCH
Pt already has RW, SAC, shower seat, grab bar in shower.  Pending progress, pt may need commode. MedAlert device recommended.  Pt already has RW, SAC, shower seat, grab bar in shower.  Pending progress, pt may need commode.

## 2022-05-08 NOTE — CONSULT NOTE ADULT - NS ATTEND AMEND GEN_ALL_CORE FT
Pt was seen and examined by me. Her daughter is also at bedside.   Very pleasant 87 yo female, very active with HTN and HLD.  No stroke, CVA, DM or HF.   Pt appears to have fallen after she turned around while she pushed a chair under the table. She does not recall the evet, she had syncope.   denied any cp or sob, lightheadedness prior to event  This is a first syncopal event for this pt. CT head reviewed. TTE is also unrevealing. no AS  EKG with NSR, LVH, nonspecific T wave changes  MRI of spine and head is pending  Monitor on tele.   thus far it appears that pt fell and trauma caused her to have syncope and concussion.   Replace electrolytes,   Neuro checks as per protocol  we will follow with you.

## 2022-05-08 NOTE — CONSULT NOTE ADULT - PROBLEM SELECTOR RECOMMENDATION 2
Check echo to assess LV function and for structural heart disease  check trop  Ekg this am  Telemetry monitoring  No arrhythmias so far  will follow with you

## 2022-05-08 NOTE — CONSULT NOTE ADULT - ASSESSMENT
Assessment: 86F s/p mechanical fall with increased frontotemporal hemorrhagic contusion and stable subdural hematoma. Patient is neurologically intact on initial examination. Admitted to NSICU for observation and medical optimization.     Plan  - Q2h neurochecks   - Keppra 500 BID for seizure prophylaxis   - Repeat CTH to assess hemorrhagic contusion growth versus stabilization   - C- collar pending MRI to r/o ligamentous injury in the setting of neck pain when turning her head to look to the right   - SBP < 140 to reduce risk of further hemorrhage  - Cardiac workup for possible syncope as patient doesnt remember why she fell   - Saturating well on RA   - NPO pending bedside dysphagia   - Maintenance fluids until oral intake secured   - Hold all antiplatelet or anticoagulation medications until cleared by neurosurgery team   - SCDs for VTE   - Maintain normoglycemia   - Further plan to be discussed during morning rounds 
87 yo female with a pmhx of HTN and HLD presents as a transfer from [facility] [place] with reports of a fall while at [employer] pushing a chair. Patient states that the last thing she remembers is trying to move the chair and then she woke up on the floor. She states prior to the fall she felt fine, denied any headaches, chest pain or shortness of breathe. (+)HS  (+) LOC. Patient reports a mild headache, trouble speaking but denies any neck pain, numbness, tingling or weakness in her extremities.   patient found to have a increasing frontotemporal hemorrhagic contusion and stable subdural hematoma.  + mild aphasia.  We were asked to see patient for work up for syncope  She denies any previous cardiac hx or work up.  No previous syncope.  no chest pain or palpitations prior to the event

## 2022-05-08 NOTE — PROGRESS NOTE ADULT - SUBJECTIVE AND OBJECTIVE BOX
cc: fall , head trauma , shd, ich       h%p / micu course : 85 yo female with a pmhx of HTN and HGLD [place] a [place] [facility] [place] of a fall/ head trauma  loc  while at [place] pushing a chair. Patient states that the last thing she remembers is trying to move the chair and then she woke up on the floor. She states prior to the fall she felt fine, denied any headaches, chest pain or shortness of breathe. (+)HS  (+) LOC. Patient reports a mild headache, trouble speaking but denies any neck pain, numbness, tingling or weakness in her extremities. Of note son at bedside also reporting a change in patients speech (07 May 2022 17:27)5/7: Repeat CTH read as some worsening in small hemorrhagic contusion, admitted to neurosurgery in neuro ICU. 5/8: Third CTH read as stable from second. Exam remains intact. Continues to complain of neck pain when C collar removed, in for MRI C-spine. Stable for downgrade.    patient downgraded to medicine service 5/8.   patient seen and eval. son saman at bedside. patient c/o mild rt temporal headache since fall , no change in severity or character. has neck collar on. also c/o mild neck pain , stable. neuro sx team aware , no change in character or severity , pending mri c spine.       Vital Signs Last 24 Hrs  T(C): 36.8 (08 May 2022 11:12), Max: 37.3 (08 May 2022 07:20)  T(F): 98.2 (08 May 2022 11:12), Max: 99.1 (08 May 2022 07:20)  HR: 87 (08 May 2022 14:00) (78 - 102)  BP: 135/76 (08 May 2022 14:00) (107/72 - 150/60)  BP(mean): 92 (08 May 2022 14:00) (73 - 103)  RR: 18 (08 May 2022 14:00) (12 - 25)  SpO2: 93% (08 May 2022 14:00) (92% - 100%)    gen : awake  , alert x 3 , comfortable , in no acute distress,   heent : neck collar in place , has rt eye periorbital bruising and subconjunctival hemorrhage noted. mild rt temporal bruise noted.   lungs: clear b/l   cvs : s1, s2, rrr, no m/r   abd : soft not tender , bs nl   ext: no edema b/l   neuro: no slurred speech or facial droop noted. motoro and sensory intact upper and lower ext, vision grossly intact    psych: good mood     MEDICATIONS  (STANDING):  atorvastatin 10 milliGRAM(s) Oral at bedtime  dextrose 5%. 1000 milliLiter(s) (50 mL/Hr) IV Continuous <Continuous>  dextrose 50% Injectable 25 Gram(s) IV Push once  glucagon  Injectable 1 milliGRAM(s) IntraMuscular once  insulin lispro (ADMELOG) corrective regimen sliding scale   SubCutaneous every 6 hours  lactated ringers. 1000 milliLiter(s) (60 mL/Hr) IV Continuous <Continuous>  levETIRAcetam  IVPB 500 milliGRAM(s) IV Intermittent every 12 hours    MEDICATIONS  (PRN):  dextrose Oral Gel 15 Gram(s) Oral once PRN Blood Glucose LESS THAN 70 milliGRAM(s)/deciliter  hydrALAZINE Injectable 10 milliGRAM(s) IV Push every 6 hours PRN SBP >160  labetalol Injectable 10 milliGRAM(s) IV Push every 6 hours PRN SBP >160  ondansetron Injectable 4 milliGRAM(s) IV Push every 6 hours PRN Nausea and/or Vomiting      < from: CT Head No Cont (05.08.22 @ 01:26) >  A large right-sided scalp hematoma is again noted, similar in size   compared to the prior study.    Acute subdural hematomas are again noted in the left temporal-posterior   frontal, left parietal-occipital, left tentorial, and left posterior   fossa, grossly stable in size. A left posterior fossa subdural   hemato-hygroma versus an arachnoid cyst containing layering hemorrhage is   unchanged.    A focal hemorrhage in the left anterior temporal region is unchanged. It   is not certain whether this reflects a hemorrhagic brain contusion, a   focal subarachnoid clot in a sulcus, or a combination of both.    Moderate chronic white matter changes and generalized cerebral volume   loss are again noted. 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    There is no gross evidence for superimposed acute vascular distribution   infarct. No new brain parenchymal hemorrhages are present. A tiny lipoma   involves the anterior interhemispheric falx.    Small retention cysts involve the maxillary sinuses. Mild mucosal   thickening is noted without air-fluid levels.    Right facial and periorbital soft tissue swelling is noted.    IMPRESSION:    Grossly stable multifocal intracranial hemorrhages.      < end of copied text >

## 2022-05-08 NOTE — OCCUPATIONAL THERAPY INITIAL EVALUATION ADULT - RANGE OF MOTION EXAMINATION, UPPER EXTREMITY
bilateral shoulder flexion and abduction limited to 90 by this writer, due to spinal precautions./bilateral UE Active ROM was WNL (within normal limits)

## 2022-05-08 NOTE — CONSULT NOTE ADULT - SUBJECTIVE AND OBJECTIVE BOX
HPI: 87 yo female with a pmhx of HTN and HGLD [place] [facility] [place] while at [employer] pushing a chair. Patient states that the last thing she remembers is trying to move the chair and then she woke up on the floor. She states prior to the fall she felt fine, denied any headaches, chest pain or shortness of breathe. (+)HS  (+) LOC. Patient reports a mild headache, trouble speaking but denies any neck pain, numbness, tingling or weakness in her extremities.   Of note son at bedside also reporting a change in patients speech    PAST MEDICAL & SURGICAL HISTORY:  HTN  HLD  H/O hand surgery  H/o of Left knee replacement    SOCIAL HISTORY:  Tobacco Use: Denies   EtOH use: Denies  Substance: Denies    Allergies: No Known Allergies      Vital Signs Last 24 Hrs  T(C): 36.1 (07 May 2022 17:20), Max: 36.1 (07 May 2022 17:20)  T(F): 97 (07 May 2022 17:20), Max: 97 (07 May 2022 17:20)  HR: 78 (07 May 2022 18:01) (78 - 91)  BP: 128/65 (07 May 2022 18:01) (128/65 - 150/60)  RR: 18 (07 May 2022 18:01) (18 - 18)  SpO2: 98% (07 May 2022 18:01) (98% - 98%)    Review of Systems: Pertinent positive for mild headache and pain around area of head strike       Physical Exam:  Constitutional: NAD in ER stretcher     Neuro  * Mental Status:  GCS 15:  E(4), V(5), M(6).  Awake, alert, oriented to conversation, following all commands and conversing appropriately   * Cranial Nerves: Cnii-Cnxii grossly intact. PERRL, EOMI, tongue midline, no gaze deviation  * Motor: RUE 5/5, LUE 5/5, RLE 5/5, LLE 5/5  * Sensory: Sensation intact to light touch  * Reflexes: Not assessed  * Gait: Not assessed    Cardiovascular:  RRR  Eyes: See neurologic examination with detailed examination of eyes.  ENT: No JVD, Trachea Midline.  Respiratory: Clear to auscultation bilaterally   Gastrointestinal: Soft, nontender, nondistended.  Genitourinary: No pubic tenderness   Musculoskeletal: No muscle wasting noted, (See neuorlogic assessment for full muscle strength assessment) No pretibial edema appreciated, no appreciable calf tenderness.  Skin:  Right periorbital ecchymosis with mild edema, localized areas of edema in the right occiput      Vital Signs Last 24 Hrs  T(C): 37.2 (08 May 2022 02:00), Max: 37.2 (08 May 2022 02:00)  T(F): 99 (08 May 2022 02:00), Max: 99 (08 May 2022 02:00)  HR: 94 (08 May 2022 03:00) (78 - 99)  BP: 120/65 (08 May 2022 03:00) (117/61 - 150/60)  BP(mean): 79 (08 May 2022 03:00) (79 - 91)  RR: 19 (08 May 2022 03:00) (17 - 25)  SpO2: 95% (08 May 2022 03:00) (95% - 99%)      Labs                        15.0   10.52 )-----------( 194      ( 07 May 2022 17:09 )             48.1       05-07    142  |  108<H>  |  19.7  ----------------------------<  181<H>  3.8   |  21.0<L>  |  0.63    Ca    9.4      07 May 2022 17:09    TPro  7.0  /  Alb  4.2  /  TBili  0.3<L>  /  DBili  x   /  AST  24  /  ALT  19  /  AlkPhos  81  05-07      LIVER FUNCTIONS - ( 07 May 2022 17:09 )  Alb: 4.2 g/dL / Pro: 7.0 g/dL / ALK PHOS: 81 U/L / ALT: 19 U/L / AST: 24 U/L / GGT: x           PTT - ( 07 May 2022 17:09 )  PTT:27.7 sec    Neurosurgery Imaging: I attest that all recent neurosurgical imaging was personally reviewed      Medications:  MEDICATIONS  (STANDING):  dextrose 5%. 1000 milliLiter(s) (50 mL/Hr) IV Continuous <Continuous>  dextrose 50% Injectable 25 Gram(s) IV Push once  glucagon  Injectable 1 milliGRAM(s) IntraMuscular once  insulin lispro (ADMELOG) corrective regimen sliding scale   SubCutaneous every 6 hours  lactated ringers. 1000 milliLiter(s) (60 mL/Hr) IV Continuous <Continuous>  levETIRAcetam  IVPB 500 milliGRAM(s) IV Intermittent every 12 hours    MEDICATIONS  (PRN):  dextrose Oral Gel 15 Gram(s) Oral once PRN Blood Glucose LESS THAN 70 milliGRAM(s)/deciliter  hydrALAZINE Injectable 10 milliGRAM(s) IV Push every 6 hours PRN SBP >140  labetalol Injectable 10 milliGRAM(s) IV Push every 6 hours PRN HR >60  ondansetron Injectable 4 milliGRAM(s) IV Push every 6 hours PRN Nausea and/or Vomiting

## 2022-05-08 NOTE — OCCUPATIONAL THERAPY INITIAL EVALUATION ADULT - ADDITIONAL COMMENTS
Pt lives in private home with1 steps to enter, +HR.  Once inside there are no stairs she needs to manage. All needs, including laundry, met on main level.   Pt already has RW, SAC, shower chair,  grab bar in shower.

## 2022-05-08 NOTE — OCCUPATIONAL THERAPY INITIAL EVALUATION ADULT - VISUAL ACUITY
Pt wears glasses for driving.  No new visual complaints.  Right eye +echymosis, mild swelling but able to open/close eye, and see without difficulty.

## 2022-05-08 NOTE — CONSULT NOTE ADULT - PROBLEM SELECTOR RECOMMENDATION 9
Neuro surgical ICU monitoirng  Avoid any anticoagulants Neuro surgical ICU monitoring  Avoid any anticoagulants

## 2022-05-08 NOTE — OCCUPATIONAL THERAPY INITIAL EVALUATION ADULT - PERTINENT HX OF CURRENT PROBLEM, REHAB EVAL
s/p mechanical fall with increased frontotemporal hemorrhagic contusion and stable subdural hematoma

## 2022-05-08 NOTE — CONSULT NOTE ADULT - SUBJECTIVE AND OBJECTIVE BOX
[facility]                                              CARDIOLOGY AT [place] [address]                                             Telephone: [phone]. Fax:[phone]                                                         CARDIOLOGY CONSULTATION NOTE                                                                                             Consult requested by:    History obtained by: Patient and medical record  Community Cardiologist:    obtained: Yes [  ] No [  ]  Reason for Consultation:   Avialable out pt records reviewed: Yes [  ] No [  ]    Chief complaint:    Patient is a 86y old  Female who presents with a chief complaint of Evolving Left frontotemporal hemorrhagic contusion & SDH (08 May 2022 03:56)        HPI:  MRs: 0  NIH 1    Patient is a 86y old  Female who presents with a chief complaint of a fall    HPI:  87 yo female with a pmhx of HTN and HGLD [place] a [place] [facility] [place] with [place] of a fall while at [place] pushing a chair. Patient states that the last thing she remembers is trying to move the chair and then she woke up on the floor. She states prior to the fall she felt fine, denied any headaches, chest pain or shortness of breathe. (+)HS  (+) LOC. Patient reports a mild headache, trouble speaking but denies any neck pain, numbness, tingling or weakness in her extremities.     Of note son at bedside also reporting a change in patients speech    PAST MEDICAL & SURGICAL HISTORY:  HTN    HLD    H/O hand surgery    H/o of Left knee replacement    SOCIAL HISTORY:  Tobacco Use: Denies   EtOH use: Denies  Substance: Denies    Allergies    No Known Allergies    Intolerances    Vital Signs Last 24 Hrs  T(C): 36.1 (07 May 2022 17:20), Max: 36.1 (07 May 2022 17:20)  T(F): 97 (07 May 2022 17:20), Max: 97 (07 May 2022 17:20)  HR: 78 (07 May 2022 18:01) (78 - 91)  BP: 128/65 (07 May 2022 18:01) (128/65 - 150/60)  BP(mean): --  RR: 18 (07 May 2022 18:01) (18 - 18)  SpO2: 98% (07 May 2022 18:01) (98% - 98%)   (07 May 2022 17:27)        CARDIAC TESTING   ECHO:    STRESS:    CATH:     ELECTROPHYSIOLOGY:       PAST MEDICAL HISTORY  HTN  HLD           PAST SURGICAL HISTORY  H/O hand surgery  S/P knee replacement          SOCIAL HISTORY:  Denies smoking/alcohol/drugs  CIGARETTES:     ALCOHOL:  DRUGS:      FAMILY HISTORY:  FAMILY HISTORY:      Family History of Cardiovascular Disease:  Yes [  ] No [  ]  Coronary Artery Disease in first degree relative: Yes [  ] No [  ]  Sudden Cardiac Death in First degree relative: Yes [  ] No [  ]      HOME MEDICATIONS:      CURRENT MEDICATIONS:  hydrALAZINE Injectable 10 milliGRAM(s) IV Push every 6 hours PRN  labetalol Injectable 10 milliGRAM(s) IV Push every 6 hours PRN     levETIRAcetam  IVPB  dextrose 5%.  dextrose 50% Injectable  glucagon  Injectable  insulin lispro (ADMELOG) corrective regimen sliding scale  lactated ringers.  potassium phosphate / sodium phosphate Powder (PHOS-NaK)        ALLERGIES: Allergies    No Known Allergies    Intolerances          REVIEW OF SYMPTOMS:   CONSTITUTIONAL: No fever, no chills, no weight loss, no weight gain, no fatigue   ENMT:  No vertigo; No sinus or throat pain  NECK: No pain or stiffness  CARDIOVASCULAR: No chest pain, no dyspnea, no syncope/presyncope, no palpitations, no dizziness, no Orthopnea, no Paroxsymal nocturnal dyspnea  RESPIRATORY: no Shortness of breath, no cough, no wheezing  : No dysuria, no hematuria   GI: No dark color stool, no nausea, no diarrhea, no constipation, no abdominal pain   NEURO: No headache, no slurred speech   MUSCULOSKELETAL: No joint pain or swelling; No muscle, back, or extremity pain  PSYCH: No agitation, no anxiety.    ALL OTHER REVIEW OF SYSTEMS ARE NEGATIVE.      Vital Signs Last 24 Hrs  T(C): 37.3 (08 May 2022 07:20), Max: 37.3 (08 May 2022 07:20)  T(F): 99.1 (08 May 2022 07:20), Max: 99.1 (08 May 2022 07:20)  HR: 95 (08 May 2022 09:00) (78 - 102)  BP: 107/72 (08 May 2022 09:00) (107/72 - 150/60)  BP(mean): 81 (08 May 2022 09:00) (73 - 97)  RR: 20 (08 May 2022 09:00) (17 - 25)  SpO2: 95% (08 May 2022 09:00) (92% - 99%)  INTAKE AND OUTPUT:   05-07 @ 07:01  -  05-08 @ 07:00  --------------------------------------------------------  IN: 520 mL / OUT: 1365 mL / NET: -845 mL        PHYSICAL EXAM:  Constitutional: Comfortable . No acute distress.   HEENT: Atraumatic and normocephalic , neck is supple . no JVD. No carotid bruit.  CNS: A&Ox3. No focal deficits.   Respiratory: CTAB, unlabored   Cardiovascular: RRR normal s1 s2. No murmur. No rubs or gallop.  Gastrointestinal: Soft, non-tender. +Bowel sounds.   MSK: full ROM extremities x 4  Extremities: No edema. No cyanosis   Psychiatric: Calm . no agitation.   Skin: Warm and dry, no ulcers on extremities       LABS:                        12.9   7.63  )-----------( 195      ( 08 May 2022 04:48 )             40.8     05-08    145  |  112<H>  |  15.3  ----------------------------<  177<H>  3.8   |  21.0<L>  |  0.51    Ca    8.8      08 May 2022 04:48  Phos  2.8     05-08  Mg     2.0     05-08    TPro  7.0  /  Alb  4.2  /  TBili  0.3<L>  /  DBili  x   /  AST  24  /  ALT  19  /  AlkPhos  81  05-07      ;p-BNP=  PT/INR - ( 08 May 2022 04:48 )   PT: 12.7 sec;   INR: 1.09 ratio         PTT - ( 07 May 2022 17:09 )  PTT:27.7 sec        INTERPRETATION OF TELEMETRY:     ECG:   Prior ECG: Yes [  ] No [  ]      RADIOLOGY & ADDITIONAL STUDIES:    X-ray:  reviewed by me.   CT scan:   MRI:   US:                                                [facility]                                              CARDIOLOGY AT [place] [address]                                             Telephone: [phone]. Fax:[phone]                                                         CARDIOLOGY CONSULTATION NOTE                                                                                             Consult requested by:  Fadi  History obtained by: Patient and medical record  Community Cardiologist: None   obtained: Yes [  ] No [x  ]  Reason for Consultation:  Syncope  Available out pt records reviewed: Yes [  ] No [  x]          85 yo female with a pmhx of HTN and HLD presents as a [place] from [facility] [place] with reports of a fall while at [place] pushing a chair. Patient states that the last thing she remembers is trying to move the chair and then she woke up on the floor. She states prior to the fall she felt fine, denied any headaches, chest pain or shortness of breathe. (+)HS  (+) LOC. Patient reports a mild headache, trouble speaking but denies any neck pain, numbness, tingling or weakness in her extremities.   patient found to have a increasing frontotemporal hemorrhagic contusion and stable subdural hematoma.  + mild aphasia.  We were asked to see patient for work up for syncope  She denies any previous cardiac hx or work up.  No previous syncope.  no chest pain or palpitations prior to the event    PAST MEDICAL & SURGICAL HISTORY:  HTN  HLD    PSH  H/O hand surgery  H/o of Left knee replacement    SOCIAL HISTORY:  Tobacco Use: Denies   EtOH use: Denies  Substance: Denies    Allergies NKDA    Vital Signs Last 24 Hrs  T(C): 36.1 (07 May 2022 17:20), Max: 36.1 (07 May 2022 17:20)  T(F): 97 (07 May 2022 17:20), Max: 97 (07 May 2022 17:20)  HR: 78 (07 May 2022 18:01) (78 - 91)  BP: 128/65 (07 May 2022 18:01) (128/65 - 150/60)  BP(mean): --  RR: 18 (07 May 2022 18:01) (18 - 18)  SpO2: 98% (07 May 2022 18:01) (98% - 98%)   (07 May 2022 17:27)      FAMILY HISTORY: Non contributory    Family History of Cardiovascular Disease:  Yes [  ] No [ x ]  Coronary Artery Disease in first degree relative: Yes [  ] No [ x ]  Sudden Cardiac Death in First degree relative: Yes [  ] No [x  ]      HOME MEDICATIONS:  Unknown  gets from mail order service  patient gave list at [facility] [place] but it is missing    CURRENT MEDICATIONS:  hydrALAZINE Injectable 10 milliGRAM(s) IV Push every 6 hours PRN  labetalol Injectable 10 milliGRAM(s) IV Push every 6 hours PRN   levETIRAcetam  IVPB  insulin lispro (ADMELOG) corrective regimen sliding scale  lactated ringers.  potassium phosphate / sodium phosphate Powder (PHOS-NaK)        REVIEW OF SYMPTOMS:   CONSTITUTIONAL: No fever, no chills, no weight loss, no weight gain, no fatigue   ENMT:  No vertigo; No sinus or throat pain  NECK: No pain or stiffness  + mild headache  CARDIOVASCULAR: ++ syncope - No chest pain, no dyspnea, no syncope/presyncope, no palpitations, no dizziness, no Orthopnea, no Paroxsymal nocturnal dyspnea  RESPIRATORY: no Shortness of breath, no cough, no wheezing  : No dysuria, no hematuria   GI: No dark color stool, no nausea, no diarrhea, no constipation, no abdominal pain   NEURO: + headache, + slurred speech   MUSCULOSKELETAL: No joint pain or swelling; No muscle, back, or extremity pain  PSYCH: No agitation, no anxiety.    ALL OTHER REVIEW OF SYSTEMS ARE NEGATIVE.      Vital Signs Last 24 Hrs  T(C): 37.3 (08 May 2022 07:20), Max: 37.3 (08 May 2022 07:20)  T(F): 99.1 (08 May 2022 07:20), Max: 99.1 (08 May 2022 07:20)  HR: 95 (08 May 2022 09:00) (78 - 102)  BP: 107/72 (08 May 2022 09:00) (107/72 - 150/60)  BP(mean): 81 (08 May 2022 09:00) (73 - 97)  RR: 20 (08 May 2022 09:00) (17 - 25)  SpO2: 95% (08 May 2022 09:00) (92% - 99%)  INTAKE AND OUTPUT:   05-07 @ 07:01  -  05-08 @ 07:00  --------------------------------------------------------  IN: 520 mL / OUT: 1365 mL / NET: -845 mL    PHYSICAL EXAM:  Constitutional: Comfortable . No acute distress.   HEENT: In neck brace right eye periorbital ecchymosis  CNS: A&Ox3. slurring of speach at times  Respiratory: CTAB, unlabored   Cardiovascular: RRR normal s1 s2. No murmur. No rubs or gallop.  Gastrointestinal: Soft, non-tender. +Bowel sounds.   MSK: full ROM extremities x 4  Extremities: No edema. No cyanosis   Psychiatric: Calm . no agitation.   Skin: Warm and dry, no ulcers on extremities     LABS:                        12.9   7.63  )-----------( 195      ( 08 May 2022 04:48 )             40.8     05-08    145  |  112<H>  |  15.3  ----------------------------<  177<H>  3.8   |  21.0<L>  |  0.51    Ca    8.8      08 May 2022 04:48  Phos  2.8     05-08  Mg     2.0     05-08    TPro  7.0  /  Alb  4.2  /  TBili  0.3<L>  /  DBili  x   /  AST  24  /  ALT  19  /  AlkPhos  81  05-07  ;p-BNP=  PT/INR - ( 08 May 2022 04:48 )   PT: 12.7 sec;   INR: 1.09 ratio       PTT - ( 07 May 2022 17:09 )  PTT:27.7 sec    INTERPRETATION OF TELEMETRY:   Nsr no arrhythmias      RADIOLOGY & ADDITIONAL STUDIES:   cxr napd    Echo pending                                                [facility]                                              CARDIOLOGY AT [place] [address]                                             Telephone: [phone]. Fax:[phone]                                                         CARDIOLOGY CONSULTATION NOTE                                                                                             Consult requested by:  Fadi  History obtained by: Patient and medical record  Community Cardiologist: None   obtained: Yes [  ] No [x  ]  Reason for Consultation:  Syncope  Available out pt records reviewed: Yes [  ] No [  x]          87 yo female with a pmhx of HTN and HLD presents as a transfer from [facility] with reports of a fall while at [place] pushing a chair. Patient states that the last thing she remembers is trying to move the chair and then she woke up on the floor. She states prior to the fall she felt fine, denied any headaches, chest pain or shortness of breathe. (+)HS  (+) LOC. Patient reports a mild headache, trouble speaking but denies any neck pain, numbness, tingling or weakness in her extremities.   patient found to have a increasing frontotemporal hemorrhagic contusion and stable subdural hematoma.  + mild aphasia.  We were asked to see patient for work up for syncope  She denies any previous cardiac hx or work up.  No previous syncope.  no chest pain or palpitations prior to the event    PAST MEDICAL & SURGICAL HISTORY:  HTN  HLD    PSH  H/O hand surgery  H/o of Left knee replacement    SOCIAL HISTORY:  Tobacco Use: Denies   EtOH use: Denies  Substance: Denies    Allergies NKDA    Vital Signs Last 24 Hrs  T(C): 36.1 (07 May 2022 17:20), Max: 36.1 (07 May 2022 17:20)  T(F): 97 (07 May 2022 17:20), Max: 97 (07 May 2022 17:20)  HR: 78 (07 May 2022 18:01) (78 - 91)  BP: 128/65 (07 May 2022 18:01) (128/65 - 150/60)  BP(mean): --  RR: 18 (07 May 2022 18:01) (18 - 18)  SpO2: 98% (07 May 2022 18:01) (98% - 98%)   (07 May 2022 17:27)      FAMILY HISTORY: Non contributory    Family History of Cardiovascular Disease:  Yes [  ] No [ x ]  Coronary Artery Disease in first degree relative: Yes [  ] No [ x ]  Sudden Cardiac Death in First degree relative: Yes [  ] No [x  ]      HOME MEDICATIONS:  Unknown  gets from mail order service  patient gave list at [facility] but it is missing    CURRENT MEDICATIONS:  hydrALAZINE Injectable 10 milliGRAM(s) IV Push every 6 hours PRN  labetalol Injectable 10 milliGRAM(s) IV Push every 6 hours PRN   levETIRAcetam  IVPB  insulin lispro (ADMELOG) corrective regimen sliding scale  lactated ringers.  potassium phosphate / sodium phosphate Powder (PHOS-NaK)        REVIEW OF SYMPTOMS:   CONSTITUTIONAL: No fever, no chills, no weight loss, no weight gain, no fatigue   ENMT:  No vertigo; No sinus or throat pain  NECK: No pain or stiffness  + mild headache  CARDIOVASCULAR: ++ syncope - No chest pain, no dyspnea, no syncope/presyncope, no palpitations, no dizziness, no Orthopnea, no Paroxsymal nocturnal dyspnea  RESPIRATORY: no Shortness of breath, no cough, no wheezing  : No dysuria, no hematuria   GI: No dark color stool, no nausea, no diarrhea, no constipation, no abdominal pain   NEURO: + headache, + slurred speech   MUSCULOSKELETAL: No joint pain or swelling; No muscle, back, or extremity pain  PSYCH: No agitation, no anxiety.    ALL OTHER REVIEW OF SYSTEMS ARE NEGATIVE.      Vital Signs Last 24 Hrs  T(C): 37.3 (08 May 2022 07:20), Max: 37.3 (08 May 2022 07:20)  T(F): 99.1 (08 May 2022 07:20), Max: 99.1 (08 May 2022 07:20)  HR: 95 (08 May 2022 09:00) (78 - 102)  BP: 107/72 (08 May 2022 09:00) (107/72 - 150/60)  BP(mean): 81 (08 May 2022 09:00) (73 - 97)  RR: 20 (08 May 2022 09:00) (17 - 25)  SpO2: 95% (08 May 2022 09:00) (92% - 99%)  INTAKE AND OUTPUT:   05-07 @ 07:01  -  05-08 @ 07:00  --------------------------------------------------------  IN: 520 mL / OUT: 1365 mL / NET: -845 mL    PHYSICAL EXAM:  Constitutional: Comfortable . No acute distress.   HEENT: In neck brace right eye periorbital ecchymosis  CNS: A&Ox3. slurring of speach at times  Respiratory: CTAB, unlabored   Cardiovascular: RRR normal s1 s2. No murmur. No rubs or gallop.  Gastrointestinal: Soft, non-tender. +Bowel sounds.   MSK: full ROM extremities x 4  Extremities: No edema. No cyanosis   Psychiatric: Calm . no agitation.   Skin: Warm and dry, no ulcers on extremities     LABS:                        12.9   7.63  )-----------( 195      ( 08 May 2022 04:48 )             40.8     05-08    145  |  112<H>  |  15.3  ----------------------------<  177<H>  3.8   |  21.0<L>  |  0.51    Ca    8.8      08 May 2022 04:48  Phos  2.8     05-08  Mg     2.0     05-08    TPro  7.0  /  Alb  4.2  /  TBili  0.3<L>  /  DBili  x   /  AST  24  /  ALT  19  /  AlkPhos  81  05-07  ;p-BNP=  PT/INR - ( 08 May 2022 04:48 )   PT: 12.7 sec;   INR: 1.09 ratio       PTT - ( 07 May 2022 17:09 )  PTT:27.7 sec    INTERPRETATION OF TELEMETRY:   Nsr no arrhythmias      RADIOLOGY & ADDITIONAL STUDIES:   cxr napd  ekg NSR lafb No ischemic changes  Echo pending

## 2022-05-08 NOTE — PROGRESS NOTE ADULT - ASSESSMENT
85 yo female with a pmhx of HTN and HGLD presents as a transfer from [facility] [place] with reports of a fall/ head trauma,  loc , while [place] pushing a chair. a/w rt temporal sdh , ic contusion . 5/7: Repeat CTH read as some worsening in small hemorrhagic contusion, admitted to neurosurgery in neuro ICU. 5/8: Third CTH read as stable from second. Exam remains intact. Continues to complain of neck pain when C collar removed, in for MRI C-spine. Stable for downgrade.    > fall / head trauma / multiple sdh/ ich   - repeat ct h 5/8 A large right-sided scalp hematoma, Acute subdural hematomas are again noted in the left temporal-posterior  frontal, left parietal-occipital, left tentorial, and left posterior , A left posterior fossa subdural  hemato-hygroma versus an arachnoid cyst containing layering hemorrhage , A focal hemorrhage in the left anterior temporal region is unchanged.  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 Grossly stable multifocal intracranial hemorrhages.  - neuro checks q 4 , monitor for any new sxs   - neuro surgery will follow on floor as per  neuro icu team   - goal sbp 100-160, labetalol and hydralazine prn   - c/w keprpa as per neuro sx for seizure ppx   - pt/ot eval   - cervical neck mri pending   - c/w c collar till cleared by neuro sx       > syncope   - unclear if patient had syncope prior to fall or due to head trauma   - c/w tele   - cardio consulted   - echo   - may need holter monitor   - c/w statin   - monitor bp     > pre diabetes   - ssi   - cc diet     > dvt ppx: to start sq lovenox ppx dose in am 5/9     > goc : full code     > plan of care discussed with patient and son at bedside  87 yo female with a pmhx of HTN and HGLD presents as a transfer from [facility] [place] with [place] of a fall/ head trauma,  loc , while [place] a chair. a/w rt temporal sdh , ic contusion . 5/7: Repeat CTH read as some worsening in small hemorrhagic contusion, admitted to neurosurgery in neuro ICU. 5/8: Third CTH read as stable from second. Exam remains intact. Continues to complain of neck pain when C collar removed, in for MRI C-spine. Stable for downgrade.    > fall / head trauma / multiple sdh/ ich   - repeat ct h 5/8 A large right-sided scalp hematoma, Acute subdural hematomas are again noted in the left temporal-posterior  frontal, left parietal-occipital, left tentorial, and left posterior , A left posterior fossa subdural  hemato-hygroma versus an arachnoid cyst containing layering hemorrhage , A focal hemorrhage in the left anterior temporal region is unchanged.  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 Grossly stable multifocal intracranial hemorrhages.  - neuro checks q 4 , monitor for any new sxs   - neuro surgery will follow on floor as per  neuro icu team   - goal sbp 100-160, labetalol and hydralazine prn   - c/w keprpa as per neuro sx for seizure ppx   - pt/ot eval   - cervical neck mri pending   - c/w c collar till cleared by neuro sx       > syncope   - unclear if patient had syncope prior to fall or due to head trauma   - denies any pmhx of cardiac issues, loc , or balance issues . no hx of previous falls. patient still drives   - c/w tele   - cardio consulted   - echo   - may need holter monitor   - c/w statin   - monitor bp     > pre diabetes   - ssi   - cc diet     > dvt ppx: to start sq lovenox ppx dose in am 5/9     > goc : full code     > plan of care discussed with patient and son at bedside  87 yo female with a pmhx of HTN and HGLD [place] a [place] [facility] [place] of a [place]/ head trauma,  loc , while [place]. a/w rt temporal sdh , ic contusion . 5/7: Repeat CTH read as some worsening in small hemorrhagic contusion, admitted to neurosurgery in neuro ICU. 5/8: Third CTH read as stable from second. Exam remains intact. Continues to complain of neck pain when C collar removed, in for MRI C-spine. Stable for downgrade.    > fall / head trauma / multiple sdh/ ich   - repeat ct h 5/8 A large right-sided scalp hematoma, Acute subdural hematomas are again noted in the left temporal-posterior  frontal, left parietal-occipital, left tentorial, and left posterior , A left posterior fossa subdural  hemato-hygroma versus an arachnoid cyst containing layering hemorrhage , A focal hemorrhage in the left anterior temporal region is unchanged.  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 Grossly stable multifocal intracranial hemorrhages.  - neuro checks q 4 , monitor for any new sxs   - neuro surgery will follow on floor as per  neuro icu team   - goal sbp 100-160, labetalol and hydralazine prn   - c/w keprpa as per neuro sx for seizure ppx   - pt/ot eval   - cervical neck mri pending   - c/w c collar till cleared by neuro sx       > syncope   - unclear if patient had syncope prior to fall or due to head trauma   - denies any pmhx of cardiac issues, loc , or balance issues . no hx of previous falls. patient still drives   - c/w tele   - cardio consulted   - echo , carotid doppler   - may need holter monitor   - c/w statin   - monitor bp     > pre diabetes   - ssi   - cc diet     > dvt ppx: to start sq lovenox ppx dose in am 5/9     > goc : full code     > plan of care discussed with patient and son at bedside

## 2022-05-08 NOTE — PATIENT PROFILE ADULT - FALL HARM RISK - HARM RISK INTERVENTIONS

## 2022-05-08 NOTE — PATIENT PROFILE ADULT - ARRIVAL FROM
Anesthesia Pre Eval Note    Anesthesia ROS/Med Hx        Anesthetic Complication History:  Patient does not have a history of anesthetic complications      Pulmonary Review:  History of: asthma -     Neuro/Psych Review:  Patient does not have a neuro/psych history       Cardiovascular Review:  Patient does not have a cardiovascular history       GI/HEPATIC/RENAL Review:  Patient does not have a GI/hepatic/renalhistory       End/Other Review:    Positive for obesity class II - 35.00 - 39.99      Relevant Problems   No relevant active problems       Physical Exam     Airway   Mallampati: III  TM Distance: >3 FB  Neck ROM: Limited    Head Assessment  Head assessment: Normocephalic    General Assessment  General Assessment: Alert and oriented    Dental Exam  Dental exam normal    Pulmonary Exam    Patient Demonstrates:  Wheezing    Abdominal Exam    Patient Demonstrates:  Obese      Anesthesia Plan    ASA Status: 2    Anesthesia Type: General    Induction: Intravenous  Preferred Airway Type: LMA  Maintenance: Inhalational  Premedication: None      Risks Discussed: Nausea, Vomiting, Sore Throat and Dental Injury    Post-op Pain Management: Single Shot Block and Per Surgeon      Checklist  Reviewed: Lab Results, Past Med History, NPO Status, Patient Summary, Allergies, Medications and Problem list    Informed Consent  The proposed anesthetic plan, including its risks and benefits, have been discussed with the Patient  - along with the risks and benefits of alternatives.  Questions were encouraged and answered and the patient and/or representative understands and agrees to proceed.     Informed Consent for Blood: Consented    Comments  Plan Comments:  I reviewed the H&P, I examined the patient, and there are no changes in the patient's condition.    I discussed with patient, the risks and benefits of general anesthesia. Patient's health status has been optimized. I discussed with the patient the following: oropharyngeal  trauma, cardiac and pulmonary complication, and post-operative nausea and vomiting. The patient understands, questions have been answered and he wishes to proceed.      Discussed r/b of single shot adductor canal block, including but not limited to infection/bleeding, LAST, block inefficacy. Patient is agreeable.      Home

## 2022-05-08 NOTE — CONSULT NOTE ADULT - CRITICAL CARE ATTENDING COMMENT
86F s/p mechanical fall with increased frontotemporal hemorrhagic contusion and stable subdural hematoma. Patient is neurologically intact on initial examination. Admitted to NSICU for observation and medical optimization.     Agree with above assessment and plan    Neurointact    q2hr neurochecks  repeat CTH for stability  syncopal workup  keppra  SBP<140

## 2022-05-09 LAB
ANION GAP SERPL CALC-SCNC: 12 MMOL/L — SIGNIFICANT CHANGE UP (ref 5–17)
BUN SERPL-MCNC: 12.9 MG/DL — SIGNIFICANT CHANGE UP (ref 8–20)
CALCIUM SERPL-MCNC: 8.8 MG/DL — SIGNIFICANT CHANGE UP (ref 8.6–10.2)
CHLORIDE SERPL-SCNC: 109 MMOL/L — HIGH (ref 98–107)
CO2 SERPL-SCNC: 24 MMOL/L — SIGNIFICANT CHANGE UP (ref 22–29)
CREAT SERPL-MCNC: 0.42 MG/DL — LOW (ref 0.5–1.3)
EGFR: 96 ML/MIN/1.73M2 — SIGNIFICANT CHANGE UP
GLUCOSE BLDC GLUCOMTR-MCNC: 104 MG/DL — HIGH (ref 70–99)
GLUCOSE BLDC GLUCOMTR-MCNC: 110 MG/DL — HIGH (ref 70–99)
GLUCOSE BLDC GLUCOMTR-MCNC: 140 MG/DL — HIGH (ref 70–99)
GLUCOSE BLDC GLUCOMTR-MCNC: 143 MG/DL — HIGH (ref 70–99)
GLUCOSE SERPL-MCNC: 136 MG/DL — HIGH (ref 70–99)
HCT VFR BLD CALC: 40.1 % — SIGNIFICANT CHANGE UP (ref 34.5–45)
HGB BLD-MCNC: 12.6 G/DL — SIGNIFICANT CHANGE UP (ref 11.5–15.5)
MAGNESIUM SERPL-MCNC: 2.1 MG/DL — SIGNIFICANT CHANGE UP (ref 1.6–2.6)
MCHC RBC-ENTMCNC: 28.4 PG — SIGNIFICANT CHANGE UP (ref 27–34)
MCHC RBC-ENTMCNC: 31.4 GM/DL — LOW (ref 32–36)
MCV RBC AUTO: 90.3 FL — SIGNIFICANT CHANGE UP (ref 80–100)
PHOSPHATE SERPL-MCNC: 2.1 MG/DL — LOW (ref 2.4–4.7)
PLATELET # BLD AUTO: 168 K/UL — SIGNIFICANT CHANGE UP (ref 150–400)
POTASSIUM SERPL-MCNC: 4 MMOL/L — SIGNIFICANT CHANGE UP (ref 3.5–5.3)
POTASSIUM SERPL-SCNC: 4 MMOL/L — SIGNIFICANT CHANGE UP (ref 3.5–5.3)
RBC # BLD: 4.44 M/UL — SIGNIFICANT CHANGE UP (ref 3.8–5.2)
RBC # FLD: 13.4 % — SIGNIFICANT CHANGE UP (ref 10.3–14.5)
SODIUM SERPL-SCNC: 145 MMOL/L — SIGNIFICANT CHANGE UP (ref 135–145)
WBC # BLD: 5.65 K/UL — SIGNIFICANT CHANGE UP (ref 3.8–10.5)
WBC # FLD AUTO: 5.65 K/UL — SIGNIFICANT CHANGE UP (ref 3.8–10.5)

## 2022-05-09 PROCEDURE — 93880 EXTRACRANIAL BILAT STUDY: CPT | Mod: 26

## 2022-05-09 PROCEDURE — 99232 SBSQ HOSP IP/OBS MODERATE 35: CPT

## 2022-05-09 PROCEDURE — 72141 MRI NECK SPINE W/O DYE: CPT | Mod: 26

## 2022-05-09 PROCEDURE — 99233 SBSQ HOSP IP/OBS HIGH 50: CPT

## 2022-05-09 RX ORDER — ASPIRIN/CALCIUM CARB/MAGNESIUM 324 MG
81 TABLET ORAL DAILY
Refills: 0 | Status: DISCONTINUED | OUTPATIENT
Start: 2022-05-09 | End: 2022-05-13

## 2022-05-09 RX ORDER — ENOXAPARIN SODIUM 100 MG/ML
40 INJECTION SUBCUTANEOUS EVERY 24 HOURS
Refills: 0 | Status: DISCONTINUED | OUTPATIENT
Start: 2022-05-09 | End: 2022-05-13

## 2022-05-09 RX ORDER — LOSARTAN POTASSIUM 100 MG/1
50 TABLET, FILM COATED ORAL DAILY
Refills: 0 | Status: DISCONTINUED | OUTPATIENT
Start: 2022-05-09 | End: 2022-05-10

## 2022-05-09 RX ORDER — METOPROLOL TARTRATE 50 MG
25 TABLET ORAL DAILY
Refills: 0 | Status: DISCONTINUED | OUTPATIENT
Start: 2022-05-09 | End: 2022-05-13

## 2022-05-09 RX ORDER — MULTIVIT-MIN/FERROUS GLUCONATE 9 MG/15 ML
1 LIQUID (ML) ORAL
Qty: 0 | Refills: 0 | DISCHARGE

## 2022-05-09 RX ORDER — FLUTICASONE PROPIONATE 50 MCG
1 SPRAY, SUSPENSION NASAL
Qty: 0 | Refills: 0 | DISCHARGE

## 2022-05-09 RX ADMIN — Medication 10 MILLIGRAM(S): at 18:02

## 2022-05-09 RX ADMIN — LEVETIRACETAM 420 MILLIGRAM(S): 250 TABLET, FILM COATED ORAL at 18:02

## 2022-05-09 RX ADMIN — LEVETIRACETAM 420 MILLIGRAM(S): 250 TABLET, FILM COATED ORAL at 05:10

## 2022-05-09 RX ADMIN — ONDANSETRON 4 MILLIGRAM(S): 8 TABLET, FILM COATED ORAL at 16:17

## 2022-05-09 RX ADMIN — Medication 25 MILLIGRAM(S): at 16:12

## 2022-05-09 RX ADMIN — ATORVASTATIN CALCIUM 10 MILLIGRAM(S): 80 TABLET, FILM COATED ORAL at 22:15

## 2022-05-09 RX ADMIN — ENOXAPARIN SODIUM 40 MILLIGRAM(S): 100 INJECTION SUBCUTANEOUS at 16:12

## 2022-05-09 RX ADMIN — LOSARTAN POTASSIUM 50 MILLIGRAM(S): 100 TABLET, FILM COATED ORAL at 16:11

## 2022-05-09 NOTE — PHYSICAL THERAPY INITIAL EVALUATION ADULT - RANGE OF MOTION EXAMINATION, REHAB EVAL
B UEs limited to 90 degrees due to spinal precautions./bilateral lower extremity ROM was WFL (within functional limits)/deficits as listed below

## 2022-05-09 NOTE — PROGRESS NOTE ADULT - SUBJECTIVE AND OBJECTIVE BOX
85 yo female with a pmhx of HTN and HGLD presents as a transfer from [facility] with reports of a fall while at [place] pushing a chair. Patient states that the last thing she remembers is trying to move the chair and then she woke up on the floor. She states prior to the fall she felt fine, denied any headaches, chest pain or shortness of breathe. (+)HS  (+) LOC.   Of note son at bedside also reporting a change in patients speech    No events overnight   Still with mild headaches and mild neck pain on lateral rotation        Vital Signs Last 24 Hrs  T(C): 36.9 (09 May 2022 08:42), Max: 37 (08 May 2022 15:58)  T(F): 98.5 (09 May 2022 08:42), Max: 98.6 (08 May 2022 15:58)  HR: 75 (09 May 2022 08:42) (71 - 96)  BP: 142/78 (09 May 2022 08:42) (113/94 - 151/69)  BP(mean): 89 (08 May 2022 21:00) (82 - 103)  RR: 18 (09 May 2022 08:42) (12 - 24)  SpO2: 96% (09 May 2022 08:42) (92% - 100%)    PHYSICAL EXAM:  NAD, well-groomed, well-developed  Awake, alert and oriented x3; Right periorbital ecchymosis and subconjunctival hemorrhage    speech clear and fluent; CASTRO x4 with 5/5 strength throughout   following commands briskly   c-collar in place     LABS:                        12.6   5.65  )-----------( 168      ( 09 May 2022 05:47 )             40.1     05-09    145  |  109<H>  |  12.9  ----------------------------<  136<H>  4.0   |  24.0  |  0.42<L>    Ca    8.8      09 May 2022 05:45  Phos  2.1     05-09  Mg     2.1     05-09    TPro  7.0  /  Alb  4.2  /  TBili  0.3<L>  /  DBili  x   /  AST  24  /  ALT  19  /  AlkPhos  81  05-07    PT/INR - ( 08 May 2022 04:48 )   PT: 12.7 sec;   INR: 1.09 ratio         PTT - ( 07 May 2022 17:09 )  PTT:27.7 sec      05-08 @ 07:01  -  05-09 @ 07:00  --------------------------------------------------------  IN: 1280 mL / OUT: 150 mL / NET: 1130 mL        CAPRINI SCORE [CLOT]:  Patient has an estimated Caprini score of greater than 5.  However, the patient's unique clinical situation will be addressed in an individual manner to determine appropriate anticoagulation treatment, if any.

## 2022-05-09 NOTE — PROGRESS NOTE ADULT - SUBJECTIVE AND OBJECTIVE BOX
[facility] Division of Hospital Medicine    Chief Complaint: Brain bleed      SUBJECTIVE / OVERNIGHT EVENTS: No acute events overnight. Patient endorses headache and nausea. Denies vomiting and weakness.     Patient denies chest pain, SOB, abd pain, N/V, fever, chills, dysuria or any other complaints. All remainder ROS negative.     MEDICATIONS  (STANDING):  atorvastatin 10 milliGRAM(s) Oral at bedtime  dextrose 5%. 1000 milliLiter(s) (50 mL/Hr) IV Continuous <Continuous>  dextrose 50% Injectable 25 Gram(s) IV Push once  glucagon  Injectable 1 milliGRAM(s) IntraMuscular once  insulin lispro (ADMELOG) corrective regimen sliding scale   SubCutaneous every 6 hours  levETIRAcetam  IVPB 500 milliGRAM(s) IV Intermittent every 12 hours    MEDICATIONS  (PRN):  dextrose Oral Gel 15 Gram(s) Oral once PRN Blood Glucose LESS THAN 70 milliGRAM(s)/deciliter  hydrALAZINE Injectable 10 milliGRAM(s) IV Push every 6 hours PRN SBP >160  labetalol Injectable 10 milliGRAM(s) IV Push every 6 hours PRN SBP >160  ondansetron Injectable 4 milliGRAM(s) IV Push every 6 hours PRN Nausea and/or Vomiting        I&O's Summary    08 May 2022 07:01  -  09 May 2022 07:00  --------------------------------------------------------  IN: 1280 mL / OUT: 150 mL / NET: 1130 mL        PHYSICAL EXAM:  Vital Signs Last 24 Hrs  T(C): 36.7 (09 May 2022 12:05), Max: 37 (08 May 2022 15:58)  T(F): 98 (09 May 2022 12:05), Max: 98.6 (08 May 2022 15:58)  HR: 74 (09 May 2022 12:05) (71 - 96)  BP: 145/78 (09 May 2022 12:05) (113/94 - 151/69)  BP(mean): 89 (08 May 2022 21:00) (86 - 103)  RR: 18 (09 May 2022 12:05) (18 - 23)  SpO2: 96% (09 May 2022 12:05) (93% - 100%)        CONSTITUTIONAL: NAD, well-developed  HEENT: Neck collar in place, RT periorbital bruising and subconjunctival hemorrhage  RESPIRATORY: Normal respiratory effort; lungs are clear to auscultation bilaterally  CARDIOVASCULAR: Regular rate and rhythm, normal S1 and S2  ABDOMEN: Nontender to palpation, normoactive bowel sounds  PSYCH: A+O to person, place, and time; affect appropriate  NEUROLOGY: CN 2-12 are intact and symmetric; no gross sensory deficits;   SKIN: No rashes; no palpable lesions    LABS:                        12.6   5.65  )-----------( 168      ( 09 May 2022 05:47 )             40.1     05-09    145  |  109<H>  |  12.9  ----------------------------<  136<H>  4.0   |  24.0  |  0.42<L>    Ca    8.8      09 May 2022 05:45  Phos  2.1     05-09  Mg     2.1     05-09    TPro  7.0  /  Alb  4.2  /  TBili  0.3<L>  /  DBili  x   /  AST  24  /  ALT  19  /  AlkPhos  81  05-07    PT/INR - ( 08 May 2022 04:48 )   PT: 12.7 sec;   INR: 1.09 ratio         PTT - ( 07 May 2022 17:09 )  PTT:27.7 sec  CARDIAC MARKERS ( 08 May 2022 12:45 )  x     / <0.01 ng/mL / x     / x     / x              CAPILLARY BLOOD GLUCOSE      POCT Blood Glucose.: 110 mg/dL (09 May 2022 12:03)  POCT Blood Glucose.: 140 mg/dL (09 May 2022 08:14)  POCT Blood Glucose.: 143 mg/dL (09 May 2022 05:18)  POCT Blood Glucose.: 119 mg/dL (08 May 2022 21:03)  POCT Blood Glucose.: 113 mg/dL (08 May 2022 18:11)        RADIOLOGY & ADDITIONAL TESTS:  Results Reviewed:   Imaging Personally Reviewed:  Electrocardiogram Personally Reviewed:

## 2022-05-09 NOTE — PROGRESS NOTE ADULT - ASSESSMENT
87 y/o female s/p ? syncope and fall with TBI and neck pain   - CTH stable 5/8   - awaiting MRI cspine to r/o ligamentous injury   - PT/OT  - can start lovenox for DVT prophylaxis

## 2022-05-09 NOTE — PROGRESS NOTE ADULT - NS ATTEND AMEND GEN_ALL_CORE FT
NSGY Attg:    see above    patient seen and examined    agree with exam and plan as above  continue collar for now  MRI cervical spine pending

## 2022-05-09 NOTE — PHYSICAL THERAPY INITIAL EVALUATION ADULT - ADDITIONAL COMMENTS
Pt reports living alone in a private house with 1 step to enter. Pt reports independent with functional mobility, ADLs, and IADLs. Pt drives. Pt owns rollator and SAC.

## 2022-05-09 NOTE — PROGRESS NOTE ADULT - SUBJECTIVE AND OBJECTIVE BOX
CHIEF COMPLAINT:Patient is a 86y old  Female who presents with a chief complaint of Brain bleed (08 May 2022 14:54)    INTERVAL HISTORY:  c/o of HA, frontal.  No cp or sob  Has neck stabilizer collar.     Allergies  No Known Allergies    	  MEDICATIONS:  hydrALAZINE Injectable 10 milliGRAM(s) IV Push every 6 hours PRN  labetalol Injectable 10 milliGRAM(s) IV Push every 6 hours PRN  levETIRAcetam  IVPB 500 milliGRAM(s) IV Intermittent every 12 hours  ondansetron Injectable 4 milliGRAM(s) IV Push every 6 hours PRN  atorvastatin 10 milliGRAM(s) Oral at bedtime  dextrose 50% Injectable 25 Gram(s) IV Push once  dextrose Oral Gel 15 Gram(s) Oral once PRN  glucagon  Injectable 1 milliGRAM(s) IntraMuscular once  insulin lispro (ADMELOG) corrective regimen sliding scale   SubCutaneous every 6 hours  dextrose 5%. 1000 milliLiter(s) IV Continuous <Continuous>    PHYSICAL EXAM:  T(C): 37 (05-08-22 @ 15:58), Max: 37 (05-08-22 @ 15:58)  HR: 73 (05-08-22 @ 21:00) (71 - 96)  BP: 147/68 (05-08-22 @ 21:00) (107/72 - 151/69)  RR: 21 (05-08-22 @ 21:00) (12 - 24)  SpO2: 95% (05-08-22 @ 21:00) (92% - 100%)  I&O's Summary    08 May 2022 07:01  -  09 May 2022 07:00  --------------------------------------------------------  IN: 1280 mL / OUT: 150 mL / NET: 1130 mL    Appearance: Right conjunctival bleed, echymosis, right orbit  Lungs: CTA B/L  CVS: RRR, Normal S1 and S2, No Edema  Pulses: Normal distal pulses.  Neuro: A&O x3    TELEMETRY: NSR, PVCs.     LABS:	 	                          12.6   5.65  )-----------( 168      ( 09 May 2022 05:47 )             40.1     05-09    145  |  109<H>  |  12.9  ----------------------------<  136<H>  4.0   |  24.0  |  0.42<L>    Ca    8.8      09 May 2022 05:45  Phos  2.1     05-09  Mg     2.1     05-09    TPro  7.0  /  Alb  4.2  /  TBili  0.3<L>  /  DBili  x   /  AST  24  /  ALT  19  /  AlkPhos  81  05-07    ASSESSMENT/PLAN: 	  1. S/P Fall.  2. MRI is pending, neck collar in place  3. ?? syncope. No heart block on tele.  4. PVCs. once stable plan for stress test to exclude ischemia.   5. TTE results dw pt.  6. low phosphorous, replacement as per hospitalist.

## 2022-05-09 NOTE — PROGRESS NOTE ADULT - ASSESSMENT
87 yo female with a pmhx of HTN and HGLD presents as a transfer from [facility] with reports of a fall/ head trauma,  loc , while at [place] pushing a chair. a/w rt temporal sdh , ic contusion . 5/7: Repeat CTH read as some worsening in small hemorrhagic contusion, admitted to neurosurgery in neuro ICU. 5/8: Third CTH read as stable from second. Exam remains intact. Continues to complain of neck pain when C collar removed, in for MRI C-spine. Stable for downgrade.    > fall / head trauma / multiple sdh/ ich   - repeat ct h 5/8 A large right-sided scalp hematoma, Acute subdural hematomas are again noted in the left temporal-posterior  frontal, left parietal-occipital, left tentorial, and left posterior , A left posterior fossa subdural  hemato-hygroma versus an arachnoid cyst containing layering hemorrhage , A focal hemorrhage in the left anterior temporal region is unchanged.  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 Grossly stable multifocal intracranial hemorrhages.  - neuro checks q 4 , monitor for any new sxs   - neuro surgery will follow on floor as per  neuro icu team   - goal sbp 100-160, labetalol and hydralazine prn   - c/w keppra as per neuro sx for seizure ppx   - cervical neck mri pending   - c/w c collar till cleared by neuro sx     > syncope   - unclear if patient had syncope prior to fall or due to head trauma   - denies any pmhx of cardiac issues, loc , or balance issues . no hx of previous falls. patient still drives   - Discontinued tele  - Appreciate cardio recs- will need inpatient NST once c-collar cleared by NSGY  - F/u  carotid  - may need holter monitor   - c/w statin   - monitor bp     > pre diabetes   - ssi   - cc diet     > dvt ppx: Restarted Lovenox, cleared by NSGY    > goc : full code     > Pending PT evaluation. OT- home w/ assist

## 2022-05-10 ENCOUNTER — TRANSCRIPTION ENCOUNTER (OUTPATIENT)
Age: 87
End: 2022-05-10

## 2022-05-10 LAB
ALBUMIN SERPL ELPH-MCNC: 3.6 G/DL — SIGNIFICANT CHANGE UP (ref 3.3–5.2)
ALP SERPL-CCNC: 75 U/L — SIGNIFICANT CHANGE UP (ref 40–120)
ALT FLD-CCNC: 15 U/L — SIGNIFICANT CHANGE UP
ANION GAP SERPL CALC-SCNC: 12 MMOL/L — SIGNIFICANT CHANGE UP (ref 5–17)
AST SERPL-CCNC: 14 U/L — SIGNIFICANT CHANGE UP
BILIRUB SERPL-MCNC: 0.5 MG/DL — SIGNIFICANT CHANGE UP (ref 0.4–2)
BUN SERPL-MCNC: 12.8 MG/DL — SIGNIFICANT CHANGE UP (ref 8–20)
CALCIUM SERPL-MCNC: 8.7 MG/DL — SIGNIFICANT CHANGE UP (ref 8.6–10.2)
CHLORIDE SERPL-SCNC: 109 MMOL/L — HIGH (ref 98–107)
CO2 SERPL-SCNC: 22 MMOL/L — SIGNIFICANT CHANGE UP (ref 22–29)
CREAT SERPL-MCNC: 0.4 MG/DL — LOW (ref 0.5–1.3)
EGFR: 98 ML/MIN/1.73M2 — SIGNIFICANT CHANGE UP
GLUCOSE BLDC GLUCOMTR-MCNC: 101 MG/DL — HIGH (ref 70–99)
GLUCOSE BLDC GLUCOMTR-MCNC: 109 MG/DL — HIGH (ref 70–99)
GLUCOSE BLDC GLUCOMTR-MCNC: 110 MG/DL — HIGH (ref 70–99)
GLUCOSE BLDC GLUCOMTR-MCNC: 111 MG/DL — HIGH (ref 70–99)
GLUCOSE SERPL-MCNC: 115 MG/DL — HIGH (ref 70–99)
HCT VFR BLD CALC: 42.6 % — SIGNIFICANT CHANGE UP (ref 34.5–45)
HGB BLD-MCNC: 13.3 G/DL — SIGNIFICANT CHANGE UP (ref 11.5–15.5)
MCHC RBC-ENTMCNC: 28.2 PG — SIGNIFICANT CHANGE UP (ref 27–34)
MCHC RBC-ENTMCNC: 31.2 GM/DL — LOW (ref 32–36)
MCV RBC AUTO: 90.3 FL — SIGNIFICANT CHANGE UP (ref 80–100)
PLATELET # BLD AUTO: 176 K/UL — SIGNIFICANT CHANGE UP (ref 150–400)
POTASSIUM SERPL-MCNC: 3.9 MMOL/L — SIGNIFICANT CHANGE UP (ref 3.5–5.3)
POTASSIUM SERPL-SCNC: 3.9 MMOL/L — SIGNIFICANT CHANGE UP (ref 3.5–5.3)
PROT SERPL-MCNC: 6.4 G/DL — LOW (ref 6.6–8.7)
RBC # BLD: 4.72 M/UL — SIGNIFICANT CHANGE UP (ref 3.8–5.2)
RBC # FLD: 13.1 % — SIGNIFICANT CHANGE UP (ref 10.3–14.5)
SODIUM SERPL-SCNC: 143 MMOL/L — SIGNIFICANT CHANGE UP (ref 135–145)
WBC # BLD: 6.27 K/UL — SIGNIFICANT CHANGE UP (ref 3.8–10.5)
WBC # FLD AUTO: 6.27 K/UL — SIGNIFICANT CHANGE UP (ref 3.8–10.5)

## 2022-05-10 PROCEDURE — 99233 SBSQ HOSP IP/OBS HIGH 50: CPT

## 2022-05-10 PROCEDURE — 99232 SBSQ HOSP IP/OBS MODERATE 35: CPT

## 2022-05-10 RX ORDER — ACETAMINOPHEN 500 MG
650 TABLET ORAL ONCE
Refills: 0 | Status: COMPLETED | OUTPATIENT
Start: 2022-05-10 | End: 2022-05-10

## 2022-05-10 RX ORDER — LOSARTAN POTASSIUM 100 MG/1
100 TABLET, FILM COATED ORAL DAILY
Refills: 0 | Status: DISCONTINUED | OUTPATIENT
Start: 2022-05-10 | End: 2022-05-13

## 2022-05-10 RX ADMIN — Medication 10 MILLIGRAM(S): at 13:53

## 2022-05-10 RX ADMIN — LEVETIRACETAM 420 MILLIGRAM(S): 250 TABLET, FILM COATED ORAL at 05:56

## 2022-05-10 RX ADMIN — Medication 650 MILLIGRAM(S): at 14:02

## 2022-05-10 RX ADMIN — LOSARTAN POTASSIUM 100 MILLIGRAM(S): 100 TABLET, FILM COATED ORAL at 18:38

## 2022-05-10 RX ADMIN — LEVETIRACETAM 420 MILLIGRAM(S): 250 TABLET, FILM COATED ORAL at 17:15

## 2022-05-10 RX ADMIN — ENOXAPARIN SODIUM 40 MILLIGRAM(S): 100 INJECTION SUBCUTANEOUS at 16:57

## 2022-05-10 RX ADMIN — LOSARTAN POTASSIUM 50 MILLIGRAM(S): 100 TABLET, FILM COATED ORAL at 05:57

## 2022-05-10 RX ADMIN — Medication 650 MILLIGRAM(S): at 15:00

## 2022-05-10 RX ADMIN — Medication 81 MILLIGRAM(S): at 12:12

## 2022-05-10 RX ADMIN — Medication 25 MILLIGRAM(S): at 05:57

## 2022-05-10 RX ADMIN — ATORVASTATIN CALCIUM 10 MILLIGRAM(S): 80 TABLET, FILM COATED ORAL at 21:00

## 2022-05-10 NOTE — CHART NOTE - NSCHARTNOTEFT_GEN_A_CORE
SUBJECTIVE/OBJECTIVE  85 yo female with a pmhx of HTN and HGLD presents as a transfer from [facility] with reports of a fall while at [place] pushing a chair. Patient states that the last thing she remembers is trying to move the chair and then she woke up on the floor. She states prior to the fall she felt fine, denied any headaches, chest pain or shortness of breathe. (+)HS  (+) LOC.   Of note son at bedside also reporting a change in patients speech  - Patient with stable serial imaging of brain. CT C- spine with no acute findings. No acute overnight events.      ASSESSMENT/PLAN  - No acute neurosurgical intervention indicated  - Patient to follow up as an outpatient with Dr. Cartagena in 2 weeks.  - C-spine clear, no need for C-collar  - Pain control as needed  -PT/OT  - Lovenox for DVT ppx while in hospital  - NSGY signing off, re-consult if needed SUBJECTIVE/OBJECTIVE  85 yo female with a pmhx of HTN and HGLD presents [place] a [place] [facility] [place] of a fall while at [place] pushing a chair. Patient states that the last thing she remembers is trying to move the chair and then she woke up on the floor. She states prior to the fall she felt fine, denied any headaches, chest pain or shortness of breathe. (+)HS  (+) LOC.   Of note son at bedside also reporting a change in patients speech  - Patient with stable serial imaging of brain. CT C- spine with no acute findings (no fractures or ligamentous injury). No acute overnight events.      ASSESSMENT/PLAN  - No acute neurosurgical intervention indicated  - Patient to follow up as an outpatient with Dr. Cartagena in 2 weeks.  - C-spine clear, no need for C-collar  - Pain control as needed  -PT/OT  - Lovenox for DVT ppx while in hospital  - NSGY signing off, re-consult if needed

## 2022-05-10 NOTE — PROGRESS NOTE ADULT - PROBLEM SELECTOR PLAN 2
No heart block on telemetry  Tele has been d.c - patient is stable.  Once stable, plan is for stress test to exclude ischemia  Keep potassium > 4 and magnesium > 2

## 2022-05-10 NOTE — PROGRESS NOTE ADULT - SUBJECTIVE AND OBJECTIVE BOX
[facility] Division of Hospital Medicine    Chief Complaint: Brain bleed     SUBJECTIVE / OVERNIGHT EVENTS: No acute events overnight. HD stable.     Patient denies chest pain, SOB, abd pain, N/V, fever, chills, dysuria or any other complaints. All remainder ROS negative.     MEDICATIONS  (STANDING):  aspirin  chewable 81 milliGRAM(s) Oral daily  atorvastatin 10 milliGRAM(s) Oral at bedtime  dextrose 5%. 1000 milliLiter(s) (50 mL/Hr) IV Continuous <Continuous>  dextrose 50% Injectable 25 Gram(s) IV Push once  enoxaparin Injectable 40 milliGRAM(s) SubCutaneous every 24 hours  glucagon  Injectable 1 milliGRAM(s) IntraMuscular once  insulin lispro (ADMELOG) corrective regimen sliding scale   SubCutaneous every 6 hours  levETIRAcetam  IVPB 500 milliGRAM(s) IV Intermittent every 12 hours  losartan 50 milliGRAM(s) Oral daily  metoprolol succinate ER 25 milliGRAM(s) Oral daily    MEDICATIONS  (PRN):  dextrose Oral Gel 15 Gram(s) Oral once PRN Blood Glucose LESS THAN 70 milliGRAM(s)/deciliter  hydrALAZINE Injectable 10 milliGRAM(s) IV Push every 6 hours PRN SBP >160  labetalol Injectable 10 milliGRAM(s) IV Push every 6 hours PRN SBP >160  ondansetron Injectable 4 milliGRAM(s) IV Push every 6 hours PRN Nausea and/or Vomiting        I&O's Summary      PHYSICAL EXAM:  Vital Signs Last 24 Hrs  T(C): 36.8 (10 May 2022 11:00), Max: 37 (10 May 2022 04:37)  T(F): 98.3 (10 May 2022 11:00), Max: 98.6 (10 May 2022 04:37)  HR: 78 (10 May 2022 11:00) (71 - 80)  BP: 144/76 (10 May 2022 11:00) (144/76 - 172/90)  BP(mean): --  RR: 20 (10 May 2022 11:00) (18 - 20)  SpO2: 94% (10 May 2022 11:00) (93% - 97%)        CONSTITUTIONAL: NAD, well-developed  HEENT: Neck collar in place, RT periorbital bruising and subconjunctival hemorrhage  RESPIRATORY: Normal respiratory effort; lungs are clear to auscultation bilaterally  CARDIOVASCULAR: Regular rate and rhythm, normal S1 and S2  ABDOMEN: Nontender to palpation, normoactive bowel sounds  PSYCH: A+O to person, place, and time; affect appropriate  NEUROLOGY: CN 2-12 are intact and symmetric; no gross sensory deficits;   SKIN: No rashes; no palpable lesions      LABS:                        13.3   6.27  )-----------( 176      ( 10 May 2022 05:31 )             42.6     05-10    143  |  109<H>  |  12.8  ----------------------------<  115<H>  3.9   |  22.0  |  0.40<L>    Ca    8.7      10 May 2022 05:31  Phos  2.1     05-09  Mg     2.1     05-09    TPro  6.4<L>  /  Alb  3.6  /  TBili  0.5  /  DBili  x   /  AST  14  /  ALT  15  /  AlkPhos  75  05-10      CARDIAC MARKERS ( 08 May 2022 12:45 )  x     / <0.01 ng/mL / x     / x     / x              CAPILLARY BLOOD GLUCOSE      POCT Blood Glucose.: 110 mg/dL (10 May 2022 11:37)  POCT Blood Glucose.: 109 mg/dL (10 May 2022 05:55)  POCT Blood Glucose.: 101 mg/dL (10 May 2022 00:24)  POCT Blood Glucose.: 104 mg/dL (09 May 2022 16:38)        RADIOLOGY & ADDITIONAL TESTS:  Results Reviewed:   Imaging Personally Reviewed:  Electrocardiogram Personally Reviewed:

## 2022-05-10 NOTE — DISCHARGE NOTE PROVIDER - CARE PROVIDER_API CALL
Albino Salter (MD)  Cardiovascular Disease  [address], [address]  [place] [address]  Phone: [phone]  Fax: [phone]  Follow Up Time: 2 weeks    PMD, PMD  Phone: (   )    -  Fax: (   )    -  Follow Up Time:     Ki Cartagena)  [address], [address]  [place] [address]  Phone: [phone]  Fax: [phone]  Follow Up Time: 2 weeks

## 2022-05-10 NOTE — DISCHARGE NOTE PROVIDER - CARE PROVIDERS DIRECT ADDRESSES
[email or web address]

## 2022-05-10 NOTE — PROGRESS NOTE ADULT - SUBJECTIVE AND OBJECTIVE BOX
[facility]                                                         CARDIOLOGY AT [facility]                                                                  [address] [facility] [place] [address]                                                         Telephone: [phone]. Fax:[phone]                                                                             PROGRESS NOTE    Reason for follow up:   Brain bleed/syncope/fall  Update:   Patient removed neck collar to eat  No complaints, denies headache today and eating lunch.  Also denies chest pain, pa, or dizziness.    Review of symptoms:   Cardiac:  No chest pain. No dyspnea. No palpitations.  Respiratory: no cough. No dyspnea  Gastrointestinal: No diarrhea. No abdominal pain. No bleeding.   Neuro: No focal neuro complaints.    Vitals:  T(C): 36.8 (05-10-22 @ 13:45), Max: 37 (05-10-22 @ 04:37)  HR: 64 (05-10-22 @ 13:45) (64 - 80)  BP: 160/78 (05-10-22 @ 13:45) (144/76 - 172/90)  RR: 189 (05-10-22 @ 13:45) (18 - 189)  SpO2: 94% (05-10-22 @ 11:00) (93% - 97%)  I&O's Summary    PHYSICAL EXAM:  Appearance: Comfortable. No acute distress  HEENT:  large black and blue eye.  Normal oral mucosa  Neurologic: A & O x 3,   Cardiovascular: RRR S1 S2, No murmur, no rubs/gallops. No JVD, apical pulse palpable.    Respiratory: Diminished bilaterally bs, thin chest wall.    Gastrointestinal:  Soft, Non-tender, + BS  Lower Extremities: 2+ Peripheral Pulses, No clubbing, cyanosis, + trace ankle edema  Psychiatry: Patient is calm. No agitation.   Skin: warm and dry.    CURRENT CARDIAC MEDICATIONS:  hydrALAZINE Injectable 10 milliGRAM(s) IV Push every 6 hours PRN  labetalol Injectable 10 milliGRAM(s) IV Push every 6 hours PRN  losartan 50 milliGRAM(s) Oral daily  metoprolol succinate ER 25 milliGRAM(s) Oral daily    CURRENT OTHER MEDICATIONS:  levETIRAcetam  IVPB 500 milliGRAM(s) IV Intermittent every 12 hours  ondansetron Injectable 4 milliGRAM(s) IV Push every 6 hours PRN Nausea and/or Vomiting  atorvastatin 10 milliGRAM(s) Oral at bedtime  dextrose 50% Injectable 25 Gram(s) IV Push once, Stop order after: 1 Doses  dextrose Oral Gel 15 Gram(s) Oral once, Stop order after: 1 Doses PRN Blood Glucose LESS THAN 70 milliGRAM(s)/deciliter  glucagon  Injectable 1 milliGRAM(s) IntraMuscular once, Stop order after: 1 Doses  insulin lispro (ADMELOG) corrective regimen sliding scale   SubCutaneous every 6 hours  aspirin  chewable 81 milliGRAM(s) Oral daily  dextrose 5%. 1000 milliLiter(s) (50 mL/Hr) IV Continuous <Continuous>  enoxaparin Injectable 40 milliGRAM(s) SubCutaneous every 24 hours    LABS:	 	  ( 08 May 2022 12:45 )  Troponin T  <0.01,  CPK  X    , CKMB  X    , BNP X                            13.3   6.27  )-----------( 176      ( 10 May 2022 05:31 )             42.6     05-10    143  |  109<H>  |  12.8  ----------------------------<  115<H>  3.9   |  22.0  |  0.40<L>    Ca    8.7      10 May 2022 05:31  Phos  2.1     05-09  Mg     2.1     05-09    TPro  6.4<L>  /  Alb  3.6  /  TBili  0.5  /  DBili  x   /  AST  14  /  ALT  15  /  AlkPhos  75  05-10    PT/INR/PTT ( 08 May 2022 04:48 )                       :                       :      12.7         :       X                     .        .                   .              .           .       1.09        .                                       Lipid Profile: Date: 05-08 @ 04:47  Total cholesterol 135; Direct LDL: --; HDL: 51; Triglycerides:60  TSH: Thyroid Stimulating Hormone, Serum: 0.42 uIU/mL    TELEMETRY:   None

## 2022-05-10 NOTE — DISCHARGE NOTE PROVIDER - NSDCFUADDAPPT_GEN_ALL_CORE_FT
no need for c-collar. Will need outpatient follow up with Dr. Cartagena in 2 weeks.     pre diabetes - follow up with PMD, consistent carbohydrate diet

## 2022-05-10 NOTE — DISCHARGE NOTE PROVIDER - NSDCCPCAREPLAN_GEN_ALL_CORE_FT
PRINCIPAL DISCHARGE DIAGNOSIS  Diagnosis: Subdural hematoma  Assessment and Plan of Treatment: no need for c-collar. Will need outpatient follow up with Dr. Cartagena in 2 weeks.  c/w keppra as per neuro sx for seizure ppx. Grossly stable multifocal intracranial hemorrhages.         SECONDARY DISCHARGE DIAGNOSES  Diagnosis: Syncope  Assessment and Plan of Treatment: unclear if patient had syncope prior to fall or due to head trauma. first syncopal event for this pt.  US carotid- no significant stenosis. TTE is also unrevealing. no AS  EKG with NSR, LVH, nonspecific T wave changes    Diagnosis: Pre-diabetes  Assessment and Plan of Treatment: follow up with PMD, continue with consistent carbohydrate diet     PRINCIPAL DISCHARGE DIAGNOSIS  Diagnosis: Subdural hematoma  Assessment and Plan of Treatment: no need for c-collar. Will need outpatient follow up with Dr. Cartgaena in 2 weeks.  c/w keppra as per neuro sx for seizure ppx. Grossly stable multifocal intracranial hemorrhages. We have held your aspirin due to SDH. Please take keppra as prescribed. Please follow up with Dr. Cartagena within 2 weeks.         SECONDARY DISCHARGE DIAGNOSES  Diagnosis: Syncope  Assessment and Plan of Treatment: unclear if patient had syncope prior to fall or due to head trauma. first syncopal event for this pt.  US carotid- no significant stenosis. TTE is also unrevealing. EKG with NSR, LVH, nonspecific T wave changes. You stress test was negative.    Diagnosis: Pre-diabetes  Assessment and Plan of Treatment: follow up with PMD, continue with consistent carbohydrate diet

## 2022-05-10 NOTE — PROGRESS NOTE ADULT - PROBLEM SELECTOR PLAN 3
Goal as recommended by neuro SBP < 140 to reduce risk of further hemorrhage  today 160/78 - nurse to give apresoline IV and will repeat bp in 30 minutes.   Titrate medications as needed.    hydrALAZINE Injectable 10 milliGRAM(s) IV Push every 6 hours PRN  labetalol Injectable 10 milliGRAM(s) IV Push every 6 hours PRN  losartan 50 milliGRAM(s) Oral daily  metoprolol succinate ER 25 milliGRAM(s) Oral daily

## 2022-05-10 NOTE — DISCHARGE NOTE PROVIDER - NSDCMRMEDTOKEN_GEN_ALL_CORE_FT
aspirin 81 mg oral tablet: 1 tab(s) orally once a day  Calcium 600+D oral tablet: 1 tab(s) orally 2 times a day  Centrum Adults oral tablet: 1 tab(s) orally once a day  Cozaar 50 mg oral tablet: 1 tab(s) orally once a day  fluticasone 50 mcg/inh nasal spray: 1 spray(s) nasal once a day  Metoprolol Succinate ER 25 mg oral tablet, extended release: 1 tab(s) orally once a day  pravastatin 40 mg oral tablet: 1 tab(s) orally once a day QHS   amLODIPine 5 mg oral tablet: 1 tab(s) orally once a day  atorvastatin 40 mg oral tablet: 1 tab(s) orally once a day   Calcium 600+D oral tablet: 1 tab(s) orally 2 times a day  Centrum Adults oral tablet: 1 tab(s) orally once a day  fluticasone 50 mcg/inh nasal spray: 1 spray(s) nasal once a day  Keppra 500 mg oral tablet: 1 tab(s) orally 2 times a day   losartan 100 mg oral tablet: 1 tab(s) orally once a day  Metoprolol Succinate ER 25 mg oral tablet, extended release: 1 tab(s) orally once a day

## 2022-05-10 NOTE — PROGRESS NOTE ADULT - PROBLEM SELECTOR PLAN 1
Neuro following  MRI:  Prominent cervical lordosis. No acute fractures or   dislocations. No evidence of ligamentous injury.  Neuro checks and sbp goal as per neuro.

## 2022-05-10 NOTE — DISCHARGE NOTE PROVIDER - HOSPITAL COURSE
85 yo female with a pmhx of HTN and HGLD presents as a transfer from [facility] with reports of a fall/ head trauma,  loc , [place] [employer] [place]. a/w rt temporal sdh , ic contusion . 5/7: Repeat CTH read as some worsening in small hemorrhagic contusion, admitted to neurosurgery in neuro ICU. 5/8: Third CTH read as stable from second. Exam remains intact. Continues to complain of neck pain when C collar removed, MRI C-spine: cervical lordosis. No acute fractures or dislocations. No evidence of ligamentous injury.   Pt. s/p fall / head trauma / multiple sdh/ ich,  repeat CTH 5/8 A large right-sided scalp hematoma, Acute subdural hematomas are again noted in the left temporal-posterior  frontal, left parietal-occipital, left tentorial, and left posterior , A left posterior fossa subdural  hemato-hygroma versus an arachnoid cyst containing layering hemorrhage , A focal hemorrhage in the left anterior temporal region is unchanged.  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 Grossly stable multifocal intracranial hemorrhages.  Neuro checks monitored. Neuro surgery on board. Started on Keppra as per Neuro sx for seizure ppx. Cervical neck mri reviewed by Neuro sx-- no need for c-collar. Will need outpatient follow up with Dr. Cartagena in 2 weeks. Syncope with unclear if patient had syncope prior to fall or due to head trauma. First syncopal event for this pt. it appears that pt fell and trauma caused her to have syncope and concussion. Denies any PMHx of cardiac issues, LOC , or balance issues, no hx of previous falls. Patient still drives. Cardiology consulted will need inpatient NST   US carotid- no significant stenosis. TTE is also unrevealing. no AS. EKG with NSR, LVH, nonspecific T wave changes. May need holter monitor   Pre diabetes on consistent carbohydrate diet      Disposition: Evaluated by PT/OT recommending home w/ assist   85 yo female with a pmhx of HTN and HGLD [place] [facility] [place]/ head trauma,  [place] , [place] [facility] [place]. a/w rt temporal sdh , ic contusion . 5/7: Repeat CTH read as some worsening in small hemorrhagic contusion, admitted to neurosurgery in neuro ICU. 5/8: Third CTH read as stable from second. Exam remains intact. Continues to complain of neck pain when C collar removed, MRI C-spine: cervical lordosis. No acute fractures or dislocations. No evidence of ligamentous injury.   Pt. s/p fall / head trauma / multiple sdh/ ich,  repeat CTH 5/8 A large right-sided scalp hematoma, Acute subdural hematomas are again noted in the left temporal-posterior  frontal, left parietal-occipital, left tentorial, and left posterior , A left posterior fossa subdural  hemato-hygroma versus an arachnoid cyst containing layering hemorrhage , A focal hemorrhage in the left anterior temporal region is unchanged.  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 Grossly stable multifocal intracranial hemorrhages.  Neuro checks monitored. Neuro surgery on board. Started on Keppra as per Neuro sx for seizure ppx. Cervical neck mri reviewed by Neuro sx- no need for c-collar. Will need outpatient follow up with Dr. Cartagena in 2 weeks. Syncope with unclear if patient had syncope prior to fall or due to head trauma. First syncopal event for this pt. it appears that pt fell and trauma caused her to have syncope and concussion. Denies any PMHx of cardiac issues, LOC , or balance issues, no hx of previous falls. Patient still drives.   US carotid- no significant stenosis. TTE is also unrevealing. no AS. EKG with NSR, LVH, nonspecific T wave changes. Pre diabetes on consistent carbohydrate diet. NST negative for ischemia. Patient will need outpatient follow up with cardiology in 1-2 weeks.       Disposition: Evaluated by PT/OT recommending home w/ assist   85 yo female with a pmhx of HTN and Dyslipidemia [place] [facility] [place] and head trauma with resultant SDH and contusion on initial CT Head.  CTA head and neck without any aneurysms. CT Perfusion reported as normal    He was admitted to NSICU for monitoring as repeat CT head read as some worsening in small hemorrhagic contusion. Third CT Head read as stable from second. Exam remains intact. Continues to complain of neck pain when C collar removed,  MRI C-spine without any fracture or dislocation.     CT brain show large right-sided scalp hematoma, Acute subdural hematomas are again noted in the left temporal-posterior  frontal, left parietal-occipital, left tentorial, and left posterior, A left posterior fossa subdural  hemato-hygroma versus an arachnoid cyst containing layering hemorrhage , A focal hemorrhage in the left anterior temporal region is unchanged.  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 Grossly stable multifocal intracranial hemorrhages.  Repeat CT Head (5/13/22) without any new changes    Syncope - unclear if patient had syncope prior to fall or due to head trauma. TTE with intact EF, no significant VHD. US carotids without any stenosis, Nuclear stress test read as normal   PT/OT- home w/ assist    Disposition - Medically stable for discharge home since 5/10/22

## 2022-05-10 NOTE — DISCHARGE NOTE PROVIDER - PROVIDER TOKENS
PROVIDER:[TOKEN:[[id number]],FOLLOWUP:[2 weeks]],FREE:[LAST:[PMD],FIRST:[PMD],PHONE:[(   )    -],FAX:[(   )    -]],PROVIDER:[TOKEN:[[id number]],FOLLOWUP:[2 weeks]]

## 2022-05-10 NOTE — PROGRESS NOTE ADULT - PROBLEM/PLAN-2
Continue current medications   Check labs today. Tdap given today. Continue with healthy diet and exercise. Return to clinic if any concern. DISPLAY PLAN FREE TEXT

## 2022-05-10 NOTE — PROGRESS NOTE ADULT - ASSESSMENT
87 yo female with a pmhx of HTN and HLD presents as a transfer from [facility] [place] with reports of a fall while at [facility] pushing a chair.    S/p mechanical fall with increased frontotemporal hemorrhagic contusion and stable subdural hematoma.   Cardiac consult for syncope

## 2022-05-10 NOTE — DISCHARGE NOTE PROVIDER - ATTENDING DISCHARGE PHYSICAL EXAMINATION:
----- Message from Devon Awad [phone]     Additional Information:         Vital Signs Last 24 Hrs  T(F): 98 (11 May 2022 11:19), Max: 98.9 (11 May 2022 04:17)  HR: 72 (11 May 2022 12:00) (64 - 72)  BP: 136/71 (11 May 2022 12:00) (134/73 - 166/88)  RR: 20 (11 May 2022 11:19) (18 - 20)  SpO2: 99% (11 May 2022 11:19) (94% - 99%)    Physical Exam:  CONSTITUTIONAL: NAD, well-developed  HEENT: RT periorbital bruising and subconjunctival hemorrhage  RESPIRATORY: Normal respiratory effort; lungs are clear to auscultation bilaterally  CARDIOVASCULAR: Regular rate and rhythm, normal S1 and S2  ABDOMEN: Nontender to palpation, normoactive bowel sounds  PSYCH: A+O to person, place, and time; affect appropriate  NEUROLOGY: CN 2-12 are intact and symmetric; no gross sensory deficits;   SKIN: No rashes; no palpable lesions      OBJECTIVE:  Vital Signs Last 24 Hrs  T(C): 37 (13 May 2022 12:00), Max: 37.3 (13 May 2022 04:54)  T(F): 98.6 (13 May 2022 12:00), Max: 99.1 (13 May 2022 04:54)  HR: 80 (13 May 2022 12:00) (69 - 80)  BP: 150/74 (13 May 2022 12:00) (143/75 - 150/74)   RR: 18 (13 May 2022 12:00) (18 - 18)  SpO2: 92% (13 May 2022 12:00) (92% - 93%)    PHYSICAL EXAMINATION  General: Elderly female sitting in bed, NAD  HEENT:  Right raccoon eye  NECK:  supple  CVS: regular rate and rhythm S1 S2  RESP:  CTAB  GI:  Soft nondistended nontender BS+  : No suprapubic or CVA tenderness  MSK:  Moves all extremities  CNS:  No gross focal or global deficit appreciated  INTEG:  Right eye ecchymoses  PSYCH:  Fair mood

## 2022-05-10 NOTE — PROGRESS NOTE ADULT - ASSESSMENT
87 yo female with a pmhx of HTN and HGLD presents as a transfer from [facility] [place] [place] with reports of a fall/ head trauma,  loc , [place] [employer] [place] [facility]. a/w rt temporal sdh , ic contusion . 5/7: Repeat CTH read as some worsening in small hemorrhagic contusion, admitted to neurosurgery in neuro ICU. 5/8: Third CTH read as stable from second. Exam remains intact. Continues to complain of neck pain when C collar removed, in for MRI C-spine. Stable for downgrade.    > fall / head trauma / multiple sdh/ ich   - repeat ct h 5/8 A large right-sided scalp hematoma, Acute subdural hematomas are again noted in the left temporal-posterior  frontal, left parietal-occipital, left tentorial, and left posterior , A left posterior fossa subdural  hemato-hygroma versus an arachnoid cyst containing layering hemorrhage , A focal hemorrhage in the left anterior temporal region is unchanged.  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 Grossly stable multifocal intracranial hemorrhages.  - neuro checks q 4 , monitor for any new sxs   - neuro surgery will follow on floor as per  neuro icu team   - goal sbp 100-160, labetalol and hydralazine prn   - c/w keppra as per neuro sx for seizure ppx   - cervical neck mri reviewed  - Appreciate NSGY recs- no need for c-collar. Will need outpatient follow up with Dr. Cartagena in 2 weeks.     > syncope   - unclear if patient had syncope prior to fall or due to head trauma   - denies any pmhx of cardiac issues, loc , or balance issues . no hx of previous falls. patient still drives   - Appreciate cardio recs- will need inpatient NST   - F/u US carotid- no significant stenosis  - may need holter monitor   - c/w statin   - monitor bp     > pre diabetes   - ssi   - cc diet     > dvt ppx: Restarted Lovenox  > goc : full code     > PT/OT- home w/ assist  - Unable to reach patient's daughter, Erica ([phone]) on 5/10.
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
## 2022-05-11 DIAGNOSIS — R55 SYNCOPE AND COLLAPSE: ICD-10-CM
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
## 2022-05-11 DIAGNOSIS — H11.31 CONJUNCTIVAL HEMORRHAGE, RIGHT EYE: ICD-10-CM

## 2022-05-11 DIAGNOSIS — S06.5X9A TRAUMATIC SUBDURAL HEMORRHAGE WITH LOSS OF CONSCIOUSNESS OF UNSPECIFIED DURATION, INITIAL ENCOUNTER: ICD-10-CM
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
## 2022-05-11 DIAGNOSIS — S00.11XA CONTUSION OF RIGHT EYELID AND PERIOCULAR AREA, INITIAL ENCOUNTER: ICD-10-CM

## 2022-05-11 LAB
GLUCOSE BLDC GLUCOMTR-MCNC: 106 MG/DL — HIGH (ref 70–99)
GLUCOSE BLDC GLUCOMTR-MCNC: 107 MG/DL — HIGH (ref 70–99)
GLUCOSE BLDC GLUCOMTR-MCNC: 94 MG/DL — SIGNIFICANT CHANGE UP (ref 70–99)

## 2022-05-11 PROCEDURE — 99232 SBSQ HOSP IP/OBS MODERATE 35: CPT

## 2022-05-11 PROCEDURE — 93018 CV STRESS TEST I&R ONLY: CPT

## 2022-05-11 PROCEDURE — 93971 EXTREMITY STUDY: CPT | Mod: 26,LT

## 2022-05-11 PROCEDURE — 93016 CV STRESS TEST SUPVJ ONLY: CPT

## 2022-05-11 PROCEDURE — 78452 HT MUSCLE IMAGE SPECT MULT: CPT | Mod: 26

## 2022-05-11 RX ORDER — ASPIRIN/CALCIUM CARB/MAGNESIUM 324 MG
1 TABLET ORAL
Qty: 30 | Refills: 0
Start: 2022-05-11 | End: 2022-06-09

## 2022-05-11 RX ORDER — METOPROLOL TARTRATE 50 MG
1 TABLET ORAL
Qty: 30 | Refills: 0
Start: 2022-05-11 | End: 2022-06-09

## 2022-05-11 RX ORDER — AMLODIPINE BESYLATE 2.5 MG/1
5 TABLET ORAL DAILY
Refills: 0 | Status: DISCONTINUED | OUTPATIENT
Start: 2022-05-11 | End: 2022-05-13

## 2022-05-11 RX ORDER — METOPROLOL TARTRATE 50 MG
1 TABLET ORAL
Qty: 0 | Refills: 0 | DISCHARGE

## 2022-05-11 RX ORDER — LOSARTAN POTASSIUM 100 MG/1
1 TABLET, FILM COATED ORAL
Qty: 30 | Refills: 0
Start: 2022-05-11 | End: 2022-06-09

## 2022-05-11 RX ORDER — ASPIRIN/CALCIUM CARB/MAGNESIUM 324 MG
1 TABLET ORAL
Qty: 0 | Refills: 0 | DISCHARGE

## 2022-05-11 RX ORDER — LEVETIRACETAM 250 MG/1
1 TABLET, FILM COATED ORAL
Qty: 28 | Refills: 0
Start: 2022-05-11 | End: 2022-05-24

## 2022-05-11 RX ORDER — ATORVASTATIN CALCIUM 80 MG/1
1 TABLET, FILM COATED ORAL
Qty: 30 | Refills: 0
Start: 2022-05-11 | End: 2022-06-09

## 2022-05-11 RX ORDER — LOSARTAN POTASSIUM 100 MG/1
1 TABLET, FILM COATED ORAL
Qty: 0 | Refills: 0 | DISCHARGE

## 2022-05-11 RX ORDER — AMLODIPINE BESYLATE 2.5 MG/1
1 TABLET ORAL
Qty: 30 | Refills: 0
Start: 2022-05-11 | End: 2022-06-09

## 2022-05-11 RX ADMIN — LEVETIRACETAM 420 MILLIGRAM(S): 250 TABLET, FILM COATED ORAL at 05:56

## 2022-05-11 RX ADMIN — Medication 10 MILLIGRAM(S): at 11:09

## 2022-05-11 RX ADMIN — ATORVASTATIN CALCIUM 10 MILLIGRAM(S): 80 TABLET, FILM COATED ORAL at 21:54

## 2022-05-11 RX ADMIN — LEVETIRACETAM 420 MILLIGRAM(S): 250 TABLET, FILM COATED ORAL at 17:03

## 2022-05-11 RX ADMIN — Medication 81 MILLIGRAM(S): at 12:12

## 2022-05-11 RX ADMIN — AMLODIPINE BESYLATE 5 MILLIGRAM(S): 2.5 TABLET ORAL at 12:12

## 2022-05-11 RX ADMIN — ENOXAPARIN SODIUM 40 MILLIGRAM(S): 100 INJECTION SUBCUTANEOUS at 16:25

## 2022-05-11 NOTE — PROGRESS NOTE ADULT - ASSESSMENT
Assessment and Plan:   · Assessment	  87 yo female with a pmhx of HTN and HLD presents as a transfer from [facility] [place] with reports of a fall while at [employer] pushing a chair.    S/p mechanical fall with increased frontotemporal hemorrhagic contusion and stable subdural hematoma.   Cardiac consult for syncope. Awaiting NST today       Problem/Plan - 1:  ·  Problem: Intracranial bleed.   ·  Plan: Neuro following  MRI:  Prominent cervical lordosis. No acute fractures or   dislocations. No evidence of ligamentous injury.  Neuro checks and sbp goal as per neuro.     Problem/Plan - 2:  ·  Problem: Syncope.   ·Plan:  -Keep NPO for stress test to exclude ischemia  -keep potassium > 4 and magnesium > 2.     Problem/Plan - 3:  ·  Problem: Hypertension.   ·  Plan: Goal as recommended by neuro SBP < 140 to reduce risk of further hemorrhage  -Increase losartan 100 milliGRAM(s) Oral daily  -metoprolol succinate ER 25 milliGRAM(s) Oral daily.     Problem/Plan - 4:  ·  Problem: Hyperlipidemia.   ·  Plan: continue statin  Low cholesterol diet.

## 2022-05-11 NOTE — PROGRESS NOTE ADULT - ASSESSMENT
85 yo female with a pmhx of HTN and HGLD presents as a transfer from [facility] with reports of a fall/ head trauma,  loc , [place] [employer] [place]. a/w rt temporal sdh , ic contusion . 5/7: Repeat CTH read as some worsening in small hemorrhagic contusion, admitted to neurosurgery in neuro ICU. 5/8: Third CTH read as stable from second. Exam remains intact. Continues to complain of neck pain when C collar removed, in for MRI C-spine. Stable for downgrade.    > fall / head trauma / multiple sdh/ ich   - repeat ct h 5/8 A large right-sided scalp hematoma, Acute subdural hematomas are again noted in the left temporal-posterior  frontal, left parietal-occipital, left tentorial, and left posterior , A left posterior fossa subdural  hemato-hygroma versus an arachnoid cyst containing layering hemorrhage , A focal hemorrhage in the left anterior temporal region is unchanged.  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 Grossly stable multifocal intracranial hemorrhages.  - neuro checks q 4 , monitor for any new sxs   - neuro surgery will follow on floor as per  neuro icu team   - goal sbp 100-160, labetalol and hydralazine prn   - c/w keppra as per neuro sx for seizure ppx   - cervical neck mri reviewed  - Appreciate NSGY recs- no need for c-collar. Will need outpatient follow up with Dr. Cartagena in 2 weeks.     > syncope   - unclear if patient had syncope prior to fall or due to head trauma   - denies any pmhx of cardiac issues, loc , or balance issues . no hx of previous falls. patient still drives   - Appreciate cardio recs- Normal stress test   - F/u US carotid- no significant stenosis  - may need holter monitor   - c/w statin   - monitor bp     > pre diabetes   - ssi   - cc diet     > dvt ppx: Restarted Lovenox  > goc : full code     > PT/OT- home w/ assist  - Patient medically optimized for discharge. Family will not be able to pick her up till 5/13. CM aware. F2F w/ home PT.

## 2022-05-11 NOTE — PROGRESS NOTE ADULT - TIME BILLING
Above noted  Doing better ,no cp or sob. ambulating  No VT on monitor  BP is elevate today  Stress test negative for ischemia.  Add amlodipine 5 mg daily  Pt is stable for discharge today. FU as outpt with her provider in 1-2 weeks
pt seen and examined today.  plan of care dw pt and her daughter  pt with possible fall vs syncope. tele with pvcs  plan for stress test in am  monitor BP as well  Increase losartan to 100 mg   Pt is getting pt  We will follow with you

## 2022-05-11 NOTE — PROGRESS NOTE ADULT - SUBJECTIVE AND OBJECTIVE BOX
[facility]                                                        Office: [address] [address]                                                       Telephone: [phone]. Fax:[phone]      CARDIOLOGY PROGRESS NOTE   ([facility])    Reason for follow up:   Brain bleed/syncope/fall  Update: Patient awaiting NST today    No complaints, denies headache.  Also denies chest pain, pa, or dizziness.     CURRENT MEDICATIONS  hydrALAZINE Injectable 10 milliGRAM(s) IV Push every 6 hours PRN  labetalol Injectable 10 milliGRAM(s) IV Push every 6 hours PRN  losartan 100 milliGRAM(s) Oral daily  metoprolol succinate ER 25 milliGRAM(s) Oral daily        levETIRAcetam  IVPB 500 milliGRAM(s) IV Intermittent every 12 hours  ondansetron Injectable 4 milliGRAM(s) IV Push every 6 hours PRN      atorvastatin 10 milliGRAM(s) Oral at bedtime  dextrose 50% Injectable 25 Gram(s) IV Push once  dextrose Oral Gel 15 Gram(s) Oral once PRN  glucagon  Injectable 1 milliGRAM(s) IntraMuscular once  insulin lispro (ADMELOG) corrective regimen sliding scale   SubCutaneous every 6 hours    aspirin  chewable 81 milliGRAM(s) Oral daily  dextrose 5%. 1000 milliLiter(s) IV Continuous <Continuous>  enoxaparin Injectable 40 milliGRAM(s) SubCutaneous every 24 hours    	  TELEMETRY:   Vitals:  T(C): 37.2 (05-11-22 @ 04:17), Max: 37.2 (05-11-22 @ 04:17)  HR: 67 (05-11-22 @ 04:17) (64 - 78)  BP: 150/86 (05-11-22 @ 04:17) (134/73 - 160/78)  RR: 18 (05-11-22 @ 04:17) (18 - 20)  SpO2: 94% (05-11-22 @ 04:17) (94% - 94%)  Wt(kg): --  I&O's Summary      Daily T(C): 37.2 (05-11-22 @ 04:17), Max: 37.2 (05-11-22 @ 04:17)  HR: 67 (05-11-22 @ 04:17) (64 - 78)  BP: 150/86 (05-11-22 @ 04:17) (134/73 - 160/78)  RR: 18 (05-11-22 @ 04:17) (18 - 20)  SpO2: 94% (05-11-22 @ 04:17) (94% - 94%)  Wt(kg): --    Daily     PHYSICAL EXAM:  Appearance: Comfortable. No acute distress  HEENT:  large black and blue eye.  Normal oral mucosa  Neurologic: A & O x 3,   Cardiovascular: RRR S1 S2, No murmur, no rubs/gallops. No JVD, apical pulse palpable.    Respiratory: Diminished bilaterally bs, thin chest wall.    Gastrointestinal:  Soft, Non-tender, + BS  Lower Extremities: 2+ Peripheral Pulses, No clubbing, cyanosis, + trace ankle edema  Psychiatry: Patient is calm. No agitation.   Skin: warm and dry.      ECG (tracing reviewed by me):  	    DIAGNOSTIC TESTING:  Echocardiogram): < from: TTE Echo Complete w/ Contrast w/ Doppler (05.08.22 @ 09:24) >  Summary:   1. Technically difficult study.   2. Endocardial visualization was enhanced with intravenous echo contrast.   3. Hyperdynamic global left ventricular systolic function.   4. Left ventricular ejection fraction, by visual estimation, is >75%.   5. Spectral Doppler shows impaired relaxation pattern of left   ventricular myocardial filling (Grade I diastolic dysfunction).   6. Normal right ventricular size and function.   7. Trace mitral valve regurgitation.   8. Mild aortic regurgitation.   9. There is no evidence of pericardial effusion.    MD Jona Electronically signed on 5/8/2022 at 12:01:42 PM    < end of copied text >  < from: TTE Echo Complete w/ Contrast w/ Doppler (05.08.22 @ 09:24) >  Summary:   1. Technically difficult study.   2. Endocardial visualization was enhanced with intravenous echo contrast.   3. Hyperdynamic global left ventricular systolic function.   4. Left ventricular ejection fraction, by visual estimation, is >75%.   5. Spectral Doppler shows impaired relaxation pattern of left   ventricular myocardial filling (Grade I diastolic dysfunction).   6. Normal right ventricular size and function.   7. Trace mitral valve regurgitation.   8. Mild aortic regurgitation.   9. There is no evidence of pericardial effusion.    MD Jona Electronically signed on 5/8/2022 at 12:01:42 PM    < end of copied text >      LABS:	 	  CARDIAC MARKERS:                                  13.3   6.27  )-----------( 176      ( 10 May 2022 05:31 )             42.6     05-10    143  |  109<H>  |  12.8  ----------------------------<  115<H>  3.9   |  22.0  |  0.40<L>    Ca    8.7      10 May 2022 05:31    TPro  6.4<L>  /  Alb  3.6  /  TBili  0.5  /  DBili  x   /  AST  14  /  ALT  15  /  AlkPhos  75  05-10                                                             [facility]                                                        Office: [address] [address]                                                       Telephone: [phone]. Fax:[phone]      CARDIOLOGY PROGRESS NOTE   ([facility])    Reason for follow up:   Brain bleed/syncope/fall  Update: Patient awaiting NST today    No complaints, denies headache.  Also denies chest pain, pa, or dizziness.     CURRENT MEDICATIONS  hydrALAZINE Injectable 10 milliGRAM(s) IV Push every 6 hours PRN  labetalol Injectable 10 milliGRAM(s) IV Push every 6 hours PRN  losartan 100 milliGRAM(s) Oral daily  metoprolol succinate ER 25 milliGRAM(s) Oral daily        levETIRAcetam  IVPB 500 milliGRAM(s) IV Intermittent every 12 hours  ondansetron Injectable 4 milliGRAM(s) IV Push every 6 hours PRN      atorvastatin 10 milliGRAM(s) Oral at bedtime  dextrose 50% Injectable 25 Gram(s) IV Push once  dextrose Oral Gel 15 Gram(s) Oral once PRN  glucagon  Injectable 1 milliGRAM(s) IntraMuscular once  insulin lispro (ADMELOG) corrective regimen sliding scale   SubCutaneous every 6 hours    aspirin  chewable 81 milliGRAM(s) Oral daily  dextrose 5%. 1000 milliLiter(s) IV Continuous <Continuous>  enoxaparin Injectable 40 milliGRAM(s) SubCutaneous every 24 hours    	  TELEMETRY:   Vitals:  T(C): 37.2 (05-11-22 @ 04:17), Max: 37.2 (05-11-22 @ 04:17)  HR: 67 (05-11-22 @ 04:17) (64 - 78)  BP: 150/86 (05-11-22 @ 04:17) (134/73 - 160/78)  RR: 18 (05-11-22 @ 04:17) (18 - 20)  SpO2: 94% (05-11-22 @ 04:17) (94% - 94%)  Wt(kg): --  I&O's Summary      Daily T(C): 37.2 (05-11-22 @ 04:17), Max: 37.2 (05-11-22 @ 04:17)  HR: 67 (05-11-22 @ 04:17) (64 - 78)  BP: 150/86 (05-11-22 @ 04:17) (134/73 - 160/78)  RR: 18 (05-11-22 @ 04:17) (18 - 20)  SpO2: 94% (05-11-22 @ 04:17) (94% - 94%)  Wt(kg): --    Daily     PHYSICAL EXAM:  Appearance: Comfortable. No acute distress  HEENT:  large black and blue eye.  Normal oral mucosa  Neurologic: A & O x 3,   Cardiovascular: RRR S1 S2, No murmur, no rubs/gallops. No JVD, apical pulse palpable.    Respiratory: Diminished bilaterally bs, thin chest wall.    Gastrointestinal:  Soft, Non-tender, + BS  Lower Extremities: 2+ Peripheral Pulses, No clubbing, cyanosis, + trace ankle edema  Psychiatry: Patient is calm. No agitation.   Skin: warm and dry.      ECG (tracing reviewed by me):  	    DIAGNOSTIC TESTING:  Echocardiogram): < from: TTE Echo Complete w/ Contrast w/ Doppler (05.08.22 @ 09:24) >  Summary:   1. Technically difficult study.   2. Endocardial visualization was enhanced with intravenous echo contrast.   3. Hyperdynamic global left ventricular systolic function.   4. Left ventricular ejection fraction, by visual estimation, is >75%.   5. Spectral Doppler shows impaired relaxation pattern of left   ventricular myocardial filling (Grade I diastolic dysfunction).   6. Normal right ventricular size and function.   7. Trace mitral valve regurgitation.   8. Mild aortic regurgitation.   9. There is no evidence of pericardial effusion.    MD Jona Electronically signed on 5/8/2022 at 12:01:42 PM    < end of copied text >  < from: TTE Echo Complete w/ Contrast w/ Doppler (05.08.22 @ 09:24) >  Summary:   1. Technically difficult study.   2. Endocardial visualization was enhanced with intravenous echo contrast.   3. Hyperdynamic global left ventricular systolic function.   4. Left ventricular ejection fraction, by visual estimation, is >75%.   5. Spectral Doppler shows impaired relaxation pattern of left   ventricular myocardial filling (Grade I diastolic dysfunction).   6. Normal right ventricular size and function.   7. Trace mitral valve regurgitation.   8. Mild aortic regurgitation.   9. There is no evidence of pericardial effusion.    MD Jona Electronically signed on 5/8/2022 at 12:01:42 PM    < end of copied text >                        13.3   6.27  )-----------( 176      ( 10 May 2022 05:31 )             42.6     05-10    143  |  109<H>  |  12.8  ----------------------------<  115<H>  3.9   |  22.0  |  0.40<L>    Ca    8.7      10 May 2022 05:31    TPro  6.4<L>  /  Alb  3.6  /  TBili  0.5  /  DBili  x   /  AST  14  /  ALT  15  /  AlkPhos  75  05-10

## 2022-05-11 NOTE — PROGRESS NOTE ADULT - SUBJECTIVE AND OBJECTIVE BOX
[facility] Division of Hospital Medicine    Chief Complaint:  Brain bleed    SUBJECTIVE / OVERNIGHT EVENTS: No acute events overnight. HD stable.     Patient denies chest pain, SOB, abd pain, N/V, fever, chills, dysuria or any other complaints. All remainder ROS negative.     MEDICATIONS  (STANDING):  amLODIPine   Tablet 5 milliGRAM(s) Oral daily  aspirin  chewable 81 milliGRAM(s) Oral daily  atorvastatin 10 milliGRAM(s) Oral at bedtime  dextrose 5%. 1000 milliLiter(s) (50 mL/Hr) IV Continuous <Continuous>  dextrose 50% Injectable 25 Gram(s) IV Push once  enoxaparin Injectable 40 milliGRAM(s) SubCutaneous every 24 hours  glucagon  Injectable 1 milliGRAM(s) IntraMuscular once  insulin lispro (ADMELOG) corrective regimen sliding scale   SubCutaneous every 6 hours  levETIRAcetam  IVPB 500 milliGRAM(s) IV Intermittent every 12 hours  losartan 100 milliGRAM(s) Oral daily  metoprolol succinate ER 25 milliGRAM(s) Oral daily    MEDICATIONS  (PRN):  dextrose Oral Gel 15 Gram(s) Oral once PRN Blood Glucose LESS THAN 70 milliGRAM(s)/deciliter  hydrALAZINE Injectable 10 milliGRAM(s) IV Push every 6 hours PRN SBP >160  labetalol Injectable 10 milliGRAM(s) IV Push every 6 hours PRN SBP >160  ondansetron Injectable 4 milliGRAM(s) IV Push every 6 hours PRN Nausea and/or Vomiting        I&O's Summary      PHYSICAL EXAM:  Vital Signs Last 24 Hrs  T(C): 36.7 (11 May 2022 11:19), Max: 37.2 (11 May 2022 04:17)  T(F): 98 (11 May 2022 11:19), Max: 98.9 (11 May 2022 04:17)  HR: 72 (11 May 2022 12:00) (67 - 72)  BP: 136/71 (11 May 2022 12:00) (134/73 - 166/88)  BP(mean): --  RR: 20 (11 May 2022 11:19) (18 - 20)  SpO2: 99% (11 May 2022 11:19) (94% - 99%)      CONSTITUTIONAL: NAD, well-developed  HEENT: Neck collar in place, RT periorbital bruising and subconjunctival hemorrhage  RESPIRATORY: Normal respiratory effort; lungs are clear to auscultation bilaterally  CARDIOVASCULAR: Regular rate and rhythm, normal S1 and S2  ABDOMEN: Nontender to palpation, normoactive bowel sounds  PSYCH: A+O to person, place, and time; affect appropriate  NEUROLOGY: CN 2-12 are intact and symmetric; no gross sensory deficits;   SKIN: No rashes; no palpable lesions    LABS:                        13.3   6.27  )-----------( 176      ( 10 May 2022 05:31 )             42.6     05-10    143  |  109<H>  |  12.8  ----------------------------<  115<H>  3.9   |  22.0  |  0.40<L>    Ca    8.7      10 May 2022 05:31    TPro  6.4<L>  /  Alb  3.6  /  TBili  0.5  /  DBili  x   /  AST  14  /  ALT  15  /  AlkPhos  75  05-10              CAPILLARY BLOOD GLUCOSE      POCT Blood Glucose.: 106 mg/dL (11 May 2022 05:53)  POCT Blood Glucose.: 107 mg/dL (11 May 2022 00:45)  POCT Blood Glucose.: 111 mg/dL (10 May 2022 16:46)        RADIOLOGY & ADDITIONAL TESTS:  Results Reviewed:   Imaging Personally Reviewed:  Electrocardiogram Personally Reviewed:

## 2022-05-12 LAB
GLUCOSE BLDC GLUCOMTR-MCNC: 116 MG/DL — HIGH (ref 70–99)
GLUCOSE BLDC GLUCOMTR-MCNC: 123 MG/DL — HIGH (ref 70–99)
GLUCOSE BLDC GLUCOMTR-MCNC: 127 MG/DL — HIGH (ref 70–99)
GLUCOSE BLDC GLUCOMTR-MCNC: 133 MG/DL — HIGH (ref 70–99)
GLUCOSE BLDC GLUCOMTR-MCNC: 89 MG/DL — SIGNIFICANT CHANGE UP (ref 70–99)

## 2022-05-12 PROCEDURE — 99232 SBSQ HOSP IP/OBS MODERATE 35: CPT

## 2022-05-12 RX ORDER — INSULIN LISPRO 100/ML
VIAL (ML) SUBCUTANEOUS
Refills: 0 | Status: DISCONTINUED | OUTPATIENT
Start: 2022-05-12 | End: 2022-05-13

## 2022-05-12 RX ADMIN — LEVETIRACETAM 420 MILLIGRAM(S): 250 TABLET, FILM COATED ORAL at 17:03

## 2022-05-12 RX ADMIN — Medication 25 MILLIGRAM(S): at 05:30

## 2022-05-12 RX ADMIN — AMLODIPINE BESYLATE 5 MILLIGRAM(S): 2.5 TABLET ORAL at 05:31

## 2022-05-12 RX ADMIN — ENOXAPARIN SODIUM 40 MILLIGRAM(S): 100 INJECTION SUBCUTANEOUS at 17:03

## 2022-05-12 RX ADMIN — Medication 81 MILLIGRAM(S): at 11:40

## 2022-05-12 RX ADMIN — LEVETIRACETAM 420 MILLIGRAM(S): 250 TABLET, FILM COATED ORAL at 05:30

## 2022-05-12 RX ADMIN — ATORVASTATIN CALCIUM 10 MILLIGRAM(S): 80 TABLET, FILM COATED ORAL at 23:03

## 2022-05-12 RX ADMIN — LOSARTAN POTASSIUM 100 MILLIGRAM(S): 100 TABLET, FILM COATED ORAL at 05:31

## 2022-05-12 NOTE — PROGRESS NOTE ADULT - ASSESSMENT
85 yo female with a pmhx of HTN and HGLD presents as a transfer from [facility] [place] with reports of a fall/ head trauma,  loc , [place] at [place] pushing [place]. a/w rt temporal sdh , ic contusion . 5/7: Repeat CTH read as some worsening in small hemorrhagic contusion, admitted to neurosurgery in neuro ICU. 5/8: Third CTH read as stable from second. Exam remains intact. Continues to complain of neck pain when C collar removed, in for MRI C-spine. Stable for downgrade.    > fall / head trauma / multiple sdh/ ich   - repeat ct h 5/8 A large right-sided scalp hematoma, Acute subdural hematomas are again noted in the left temporal-posterior  frontal, left parietal-occipital, left tentorial, and left posterior , A left posterior fossa subdural  hemato-hygroma versus an arachnoid cyst containing layering hemorrhage , A focal hemorrhage in the left anterior temporal region is unchanged.  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 Grossly stable multifocal intracranial hemorrhages.  - neuro checks q 4 , monitor for any new sxs   - neuro surgery will follow on floor as per  neuro icu team   - goal sbp 100-160, labetalol and hydralazine prn   - c/w keppra as per neuro sx for seizure ppx   - cervical neck mri reviewed  - Appreciate NSGY recs- no need for c-collar. Will need outpatient follow up with Dr. Cartagena in 2 weeks.     > syncope   - unclear if patient had syncope prior to fall or due to head trauma   - denies any pmhx of cardiac issues, loc , or balance issues . no hx of previous falls. patient still drives   - Appreciate cardio recs- Normal stress test   - F/u US carotid- no significant stenosis  - may need holter monitor   - c/w statin   - monitor bp     > pre diabetes   - ssi   - cc diet     > dvt ppx: Lovenox  > goc : full code     > PT/OT- home w/ assist  - Patient medically optimized for discharge. Family will not be able to pick her up till 5/13. CM aware. F2F w/ home PT.

## 2022-05-12 NOTE — PROGRESS NOTE ADULT - SUBJECTIVE AND OBJECTIVE BOX
[facility] Division of Hospital Medicine    Chief Complaint: Brain bleed      SUBJECTIVE / OVERNIGHT EVENTS:    Patient denies chest pain, SOB, abd pain, N/V, fever, chills, dysuria or any other complaints. All remainder ROS negative.     MEDICATIONS  (STANDING):  amLODIPine   Tablet 5 milliGRAM(s) Oral daily  aspirin  chewable 81 milliGRAM(s) Oral daily  atorvastatin 10 milliGRAM(s) Oral at bedtime  dextrose 5%. 1000 milliLiter(s) (50 mL/Hr) IV Continuous <Continuous>  dextrose 50% Injectable 25 Gram(s) IV Push once  enoxaparin Injectable 40 milliGRAM(s) SubCutaneous every 24 hours  glucagon  Injectable 1 milliGRAM(s) IntraMuscular once  insulin lispro (ADMELOG) corrective regimen sliding scale   SubCutaneous Before meals and at bedtime  levETIRAcetam  IVPB 500 milliGRAM(s) IV Intermittent every 12 hours  losartan 100 milliGRAM(s) Oral daily  metoprolol succinate ER 25 milliGRAM(s) Oral daily    MEDICATIONS  (PRN):  dextrose Oral Gel 15 Gram(s) Oral once PRN Blood Glucose LESS THAN 70 milliGRAM(s)/deciliter  hydrALAZINE Injectable 10 milliGRAM(s) IV Push every 6 hours PRN SBP >160  labetalol Injectable 10 milliGRAM(s) IV Push every 6 hours PRN SBP >160  ondansetron Injectable 4 milliGRAM(s) IV Push every 6 hours PRN Nausea and/or Vomiting        I&O's Summary      PHYSICAL EXAM:  Vital Signs Last 24 Hrs  T(C): 36.7 (12 May 2022 11:02), Max: 37.4 (12 May 2022 04:49)  T(F): 98.1 (12 May 2022 11:02), Max: 99.3 (12 May 2022 04:49)  HR: 97 (12 May 2022 11:02) (84 - 97)  BP: 119/70 (12 May 2022 11:02) (119/70 - 142/76)  BP(mean): --  RR: 16 (12 May 2022 04:49) (16 - 18)  SpO2: 96% (12 May 2022 11:02) (93% - 96%)      CONSTITUTIONAL: NAD, well-developed  HEENT: Neck collar in place, RT periorbital bruising and subconjunctival hemorrhage  RESPIRATORY: Normal respiratory effort; lungs are clear to auscultation bilaterally  CARDIOVASCULAR: Regular rate and rhythm, normal S1 and S2  ABDOMEN: Nontender to palpation, normoactive bowel sounds  PSYCH: A+O to person, place, and time; affect appropriate  NEUROLOGY: CN 2-12 are intact and symmetric; no gross sensory deficits;   SKIN: No rashes; no palpable lesions    LABS:                    CAPILLARY BLOOD GLUCOSE      POCT Blood Glucose.: 116 mg/dL (12 May 2022 11:14)  POCT Blood Glucose.: 127 mg/dL (12 May 2022 08:01)  POCT Blood Glucose.: 89 mg/dL (12 May 2022 00:52)  POCT Blood Glucose.: 94 mg/dL (11 May 2022 16:23)        RADIOLOGY & ADDITIONAL TESTS:  Results Reviewed:   Imaging Personally Reviewed:  Electrocardiogram Personally Reviewed:

## 2022-05-13 ENCOUNTER — TRANSCRIPTION ENCOUNTER (OUTPATIENT)
Age: 87
End: 2022-05-13

## 2022-05-13 VITALS
SYSTOLIC BLOOD PRESSURE: 150 MMHG | HEART RATE: 80 BPM | OXYGEN SATURATION: 92 % | DIASTOLIC BLOOD PRESSURE: 74 MMHG | RESPIRATION RATE: 18 BRPM | TEMPERATURE: 99 F

## 2022-05-13 LAB
GLUCOSE BLDC GLUCOMTR-MCNC: 106 MG/DL — HIGH (ref 70–99)
GLUCOSE BLDC GLUCOMTR-MCNC: 110 MG/DL — HIGH (ref 70–99)
GLUCOSE BLDC GLUCOMTR-MCNC: 85 MG/DL — SIGNIFICANT CHANGE UP (ref 70–99)

## 2022-05-13 PROCEDURE — C8929: CPT

## 2022-05-13 PROCEDURE — 93971 EXTREMITY STUDY: CPT

## 2022-05-13 PROCEDURE — 72141 MRI NECK SPINE W/O DYE: CPT

## 2022-05-13 PROCEDURE — 84443 ASSAY THYROID STIM HORMONE: CPT

## 2022-05-13 PROCEDURE — 93880 EXTRACRANIAL BILAT STUDY: CPT

## 2022-05-13 PROCEDURE — 84484 ASSAY OF TROPONIN QUANT: CPT

## 2022-05-13 PROCEDURE — 0042T: CPT

## 2022-05-13 PROCEDURE — 80061 LIPID PANEL: CPT

## 2022-05-13 PROCEDURE — 78452 HT MUSCLE IMAGE SPECT MULT: CPT

## 2022-05-13 PROCEDURE — 85027 COMPLETE CBC AUTOMATED: CPT

## 2022-05-13 PROCEDURE — 82962 GLUCOSE BLOOD TEST: CPT

## 2022-05-13 PROCEDURE — 74177 CT ABD & PELVIS W/CONTRAST: CPT | Mod: MA

## 2022-05-13 PROCEDURE — 70450 CT HEAD/BRAIN W/O DYE: CPT | Mod: 26

## 2022-05-13 PROCEDURE — 70496 CT ANGIOGRAPHY HEAD: CPT | Mod: ME

## 2022-05-13 PROCEDURE — 80048 BASIC METABOLIC PNL TOTAL CA: CPT

## 2022-05-13 PROCEDURE — 84100 ASSAY OF PHOSPHORUS: CPT

## 2022-05-13 PROCEDURE — 83735 ASSAY OF MAGNESIUM: CPT

## 2022-05-13 PROCEDURE — 85730 THROMBOPLASTIN TIME PARTIAL: CPT

## 2022-05-13 PROCEDURE — 70498 CT ANGIOGRAPHY NECK: CPT | Mod: MG

## 2022-05-13 PROCEDURE — G1004: CPT

## 2022-05-13 PROCEDURE — 86901 BLOOD TYPING SEROLOGIC RH(D): CPT

## 2022-05-13 PROCEDURE — 70450 CT HEAD/BRAIN W/O DYE: CPT

## 2022-05-13 PROCEDURE — 86850 RBC ANTIBODY SCREEN: CPT

## 2022-05-13 PROCEDURE — 97116 GAIT TRAINING THERAPY: CPT

## 2022-05-13 PROCEDURE — A9500: CPT

## 2022-05-13 PROCEDURE — 93017 CV STRESS TEST TRACING ONLY: CPT

## 2022-05-13 PROCEDURE — 71045 X-RAY EXAM CHEST 1 VIEW: CPT

## 2022-05-13 PROCEDURE — 86900 BLOOD TYPING SEROLOGIC ABO: CPT

## 2022-05-13 PROCEDURE — 97163 PT EVAL HIGH COMPLEX 45 MIN: CPT

## 2022-05-13 PROCEDURE — 83036 HEMOGLOBIN GLYCOSYLATED A1C: CPT

## 2022-05-13 PROCEDURE — 85025 COMPLETE CBC W/AUTO DIFF WBC: CPT

## 2022-05-13 PROCEDURE — 80053 COMPREHEN METABOLIC PANEL: CPT

## 2022-05-13 PROCEDURE — U0005: CPT

## 2022-05-13 PROCEDURE — 36415 COLL VENOUS BLD VENIPUNCTURE: CPT

## 2022-05-13 PROCEDURE — 80307 DRUG TEST PRSMV CHEM ANLYZR: CPT

## 2022-05-13 PROCEDURE — 97530 THERAPEUTIC ACTIVITIES: CPT

## 2022-05-13 PROCEDURE — U0003: CPT

## 2022-05-13 PROCEDURE — 71260 CT THORAX DX C+: CPT | Mod: MA

## 2022-05-13 PROCEDURE — 93005 ELECTROCARDIOGRAM TRACING: CPT

## 2022-05-13 PROCEDURE — 85610 PROTHROMBIN TIME: CPT

## 2022-05-13 PROCEDURE — 97167 OT EVAL HIGH COMPLEX 60 MIN: CPT

## 2022-05-13 PROCEDURE — 99239 HOSP IP/OBS DSCHRG MGMT >30: CPT

## 2022-05-13 RX ORDER — ACETAMINOPHEN 500 MG
650 TABLET ORAL EVERY 6 HOURS
Refills: 0 | Status: DISCONTINUED | OUTPATIENT
Start: 2022-05-13 | End: 2022-05-13

## 2022-05-13 RX ADMIN — LOSARTAN POTASSIUM 100 MILLIGRAM(S): 100 TABLET, FILM COATED ORAL at 05:12

## 2022-05-13 RX ADMIN — Medication 81 MILLIGRAM(S): at 11:29

## 2022-05-13 RX ADMIN — Medication 650 MILLIGRAM(S): at 09:02

## 2022-05-13 RX ADMIN — Medication 25 MILLIGRAM(S): at 05:12

## 2022-05-13 RX ADMIN — LEVETIRACETAM 420 MILLIGRAM(S): 250 TABLET, FILM COATED ORAL at 05:12

## 2022-05-13 RX ADMIN — AMLODIPINE BESYLATE 5 MILLIGRAM(S): 2.5 TABLET ORAL at 05:12

## 2022-05-13 NOTE — PROGRESS NOTE ADULT - REASON FOR ADMISSION
Brain bleed

## 2022-05-13 NOTE — DISCHARGE NOTE NURSING/CASE MANAGEMENT/SOCIAL WORK - NSDCPEFALRISK_GEN_ALL_CORE
For information on Fall & Injury Prevention, visit: [email or web address] OR  [email or web address] OR  https://www.cdc.gov/steadi/patient.html

## 2022-05-13 NOTE — PROGRESS NOTE ADULT - PROVIDER SPECIALTY LIST ADULT
Cardiology
Cardiology
Hospitalist
Cardiology
Hospitalist
Neurosurgery

## 2022-05-13 NOTE — CHART NOTE - NSCHARTNOTEFT_GEN_A_CORE
Contacted by RN for HA.  Patient seen and examined at bedside. Patient c/o of headache and asking for more Tylenol. States she's had HA like this since she arrived at hospital and has been taking Tylenol regularly every day for it. She last had Tylenol this morning for the pain but it's no longer helping her. Pain is 6-7/10 pain centrally and over the right temporal region. Patient states that when she sits up or leans forward that she feels the pain rush all around her head. Admits occasional lightheadedness, none at present.    Discussed with RN, no Tylenol given on this shift, no record in EMR, patient last received Tylenol 5/10, no PRN or standing orders. Reverified patients story with her, adamant that she has been regularly receiving Tylenol but its just not helping now. Patient AxOx3. Denies changes in vision, blurry vision, dizziness, weakness, numbness, CP, palpitations, SOB or any other complaints.    Vital Signs  T(C): 37.3 (05-13-22 @ 04:54), Max: 37.3 (05-13-22 @ 04:54)  HR: 69 (05-13-22 @ 04:54) (69 - 97)  BP: 143/75 (05-13-22 @ 04:54) (119/70 - 143/75)  RR: 18 (05-13-22 @ 04:54) (18 - 18)  SpO2: 93% (05-13-22 @ 04:54) (93% - 96%)    Physical Exam:  Gen: AOx3, sitting up in bed   Eye: right periorbital edema and ecchymosis, subconjunctival hemorrhage, PERRL, EOMI  CV: RRR  Lungs: CTA b/l, unlabored respirations on RA  neuro: follows commands,MAEx4, B/L strength 5/5, no drift, sensation to light touch intact B/L upper and lower extremities    A/P: HA  repeat urgent CT head non con, patient to be taken in 15min  Tylenol PRN ordered  AC- on Lovenox, next dose due at 16:00, pend CT results for hold vs continuation   Discussed with Attending  RN to monitor, assess and escalate to PA PRN.  Will continue to monitor.

## 2022-05-13 NOTE — PROGRESS NOTE ADULT - ASSESSMENT
87 yo female with a pmhx of HTN and HGLD presents as a transfer from [facility] [place] with reports of a fall/ head trauma,  loc , [place]. a/w rt temporal sdh , ic contusion . 5/7: Repeat CTH read as some worsening in small hemorrhagic contusion, admitted to neurosurgery in neuro ICU. 5/8: Third CTH read as stable from second. Exam remains intact. Continues to complain of neck pain when C collar removed, in for MRI C-spine. Stable for downgrade.    > fall / head trauma / multiple sdh/ ich   - repeat ct h 5/8 A large right-sided scalp hematoma, Acute subdural hematomas are again noted in the left temporal-posterior  frontal, left parietal-occipital, left tentorial, and left posterior , A left posterior fossa subdural  hemato-hygroma versus an arachnoid cyst containing layering hemorrhage , A focal hemorrhage in the left anterior temporal region is unchanged.  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 Grossly stable multifocal intracranial hemorrhages.  - neuro checks q 4 , monitor for any new sxs   - neuro surgery will follow on floor as per  neuro icu team   - goal sbp 100-160, labetalol and hydralazine prn   - c/w keppra as per neuro sx for seizure ppx   - cervical neck mri reviewed  - Appreciate NSGY recs- no need for c-collar. Will need outpatient follow up with Dr. Cartagena in 2 weeks.   Repeat CT Head (5/13/22) without any new changes    > syncope   - unclear if patient had syncope prior to fall or due to head trauma   - denies any pmhx of cardiac issues, loc , or balance issues . no hx of previous falls. patient still drives   - Appreciate cardio recs- Normal stress test   - F/u US carotid- no significant stenosis  - may need holter monitor   - c/w statin   - monitor bp     > pre diabetes   - ssi   - cc diet     > dvt ppx: Lovenox  > goc : full code     > PT/OT- home w/ assist  - Patient medically optimized for discharge. Family will not be able to pick her up till 5/13. CM aware. F2F w/ home PT.      Discussed with patient, RN, PA, SW and CCC during IDR in am 87 yo female with a pmhx of HTN and Dyslipidemia transfered [place] [facility] [place] after reported fall and head trauma with resultant  rt temporal sdh , ic contusion . 5/7: Repeat CTH read as some worsening in small hemorrhagic contusion, admitted to neurosurgery in neuro ICU. 5/8: Third CTH read as stable from second. Exam remains intact. Continues to complain of neck pain when C collar removed, in for MRI C-spine. Stable for downgrade.    > fall / head trauma / multiple sdh/ ich   - repeat ct h 5/8 A large right-sided scalp hematoma, Acute subdural hematomas are again noted in the left temporal-posterior  frontal, left parietal-occipital, left tentorial, and left posterior , A left posterior fossa subdural  hemato-hygroma versus an arachnoid cyst containing layering hemorrhage , A focal hemorrhage in the left anterior temporal region is unchanged.  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 Grossly stable multifocal intracranial hemorrhages.  - neuro checks q 4 , monitor for any new sxs   - neuro surgery will follow on floor as per  neuro icu team   - goal sbp 100-160, labetalol and hydralazine prn   - c/w keppra as per neuro sx for seizure ppx   - cervical neck mri reviewed  - Appreciate NSGY recs- no need for c-collar. Will need outpatient follow up with Dr. Cartagena in 2 weeks.   Repeat CT Head (5/13/22) without any new changes    > syncope   - unclear if patient had syncope prior to fall or due to head trauma   - denies any pmhx of cardiac issues, loc , or balance issues . no hx of previous falls. patient still drives   - Appreciate cardio recs- Normal stress test   - F/u US carotid- no significant stenosis  - may need holter monitor   - c/w statin   - monitor bp     > pre diabetes   - ssi   - cc diet     > dvt ppx: Lovenox  > goc : full code     > PT/OT- home w/ assist  - Patient medically optimized for discharge. Family will not be able to pick her up till 5/13. CM aware. F2F w/ home PT.      Discussed with patient, RN, PA, SW and CCC during IDR in am 85 yo female with a pmhx of HTN and Dyslipidemia transfered from [facility] [place] after reported fall and head trauma with resultant SDH and contusion on initial CT Head.  CTA head and neck without any aneurysms. CT Perfusion reported as normal    He was admitted to NSICU for monitoring as repeat CT head read as some worsening in small hemorrhagic contusion. Third CT Head read as stable from second. Exam remains intact. Continues to complain of neck pain when C collar removed,  MRI C-spine without any fracture or disclocation.     CT brain show large right-sided scalp hematoma, Acute subdural hematomas are again noted in the left temporal-posterior  frontal, left parietal-occipital, left tentorial, and left posterior, A left posterior fossa subdural  hemato-hygroma versus an arachnoid cyst containing layering hemorrhage , A focal hemorrhage in the left anterior temporal region is unchanged.  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 Grossly stable multifocal intracranial hemorrhages.  - neuro checks q 4 , monitor for any new sxs   - neuro surgery will follow on floor as per  neuro icu team   - goal sbp 100-160, labetalol and hydralazine prn   - c/w keppra as per neuro sx for seizure ppx   - ASA, Statin pr prevent CVA  - cervical neck mri reviewed  - Appreciate NSGY recs- no need for c-collar. Will need outpatient follow up with Dr. Cartagena in 2 weeks.   Repeat CT Head (5/13/22) without any new changes    Syncope - unclear if patient had syncope prior to fall or due to head trauma. TTE with intact EF, no significant VHD. US carotids without any stenosis, Nuclear stress test read as normal  -  monitor for orthostatic    PreDiabetes  - BGM with SSI    HTN  - Losartan 100mg, Metoprolol 25mg    VTEp - LMWH  PT/OT- home w/ assist    Disposition - Medically stable for discharge home since 5/10/22       Discussed with patient, RN, PA, SW and CCC during IDR in am

## 2022-05-13 NOTE — PROGRESS NOTE ADULT - SUBJECTIVE AND OBJECTIVE BOX
HOSPITALIST PROGRESS NOTE    LOC CAMP  [id number]  86yFemale    Patient is a 86y old  Female who presents with a chief complaint of Brain bleed (12 May 2022 14:31)      SUBJECTIVE:   Chart reviewed   Patient seen and examined at bedside earlier today for ICH.  Complained of headache - denies any dizziness, paresthesia, paresis, speech or vision changes      OBJECTIVE:  Vital Signs Last 24 Hrs  T(C): 37 (13 May 2022 12:00), Max: 37.3 (13 May 2022 04:54)  T(F): 98.6 (13 May 2022 12:00), Max: 99.1 (13 May 2022 04:54)  HR: 80 (13 May 2022 12:00) (69 - 80)  BP: 150/74 (13 May 2022 12:00) (143/75 - 150/74)   RR: 18 (13 May 2022 12:00) (18 - 18)  SpO2: 92% (13 May 2022 12:00) (92% - 93%)    PHYSICAL EXAMINATION  General: Elderly female sitting in bed, NAD  HEENT:  Right raccoon eye  NECK:  supple  CVS: regular rate and rhythm S1 S2  RESP:  CTAB  GI:  Soft nondistended nontender BS+  : No suprapubic or CVA tenderness  MSK:  Moves all extremities  CNS:  No gross focal or global deficit appreciated  INTEG:  Right eye ecchymoses  PSYCH:  Fair mood    MONITOR:  CAPILLARY BLOOD GLUCOSE      POCT Blood Glucose.: 85 mg/dL (13 May 2022 16:27)  POCT Blood Glucose.: 106 mg/dL (13 May 2022 11:10)  POCT Blood Glucose.: 110 mg/dL (13 May 2022 07:43)  POCT Blood Glucose.: 133 mg/dL (12 May 2022 23:01)        I&O's Summary                      Culture:    TTE:    RADIOLOGY        MEDICATIONS  (STANDING):  amLODIPine   Tablet 5 milliGRAM(s) Oral daily  aspirin  chewable 81 milliGRAM(s) Oral daily  atorvastatin 10 milliGRAM(s) Oral at bedtime  dextrose 5%. 1000 milliLiter(s) (50 mL/Hr) IV Continuous <Continuous>  dextrose 50% Injectable 25 Gram(s) IV Push once  enoxaparin Injectable 40 milliGRAM(s) SubCutaneous every 24 hours  glucagon  Injectable 1 milliGRAM(s) IntraMuscular once  insulin lispro (ADMELOG) corrective regimen sliding scale   SubCutaneous Before meals and at bedtime  levETIRAcetam  IVPB 500 milliGRAM(s) IV Intermittent every 12 hours  losartan 100 milliGRAM(s) Oral daily  metoprolol succinate ER 25 milliGRAM(s) Oral daily      MEDICATIONS  (PRN):  acetaminophen     Tablet .. 650 milliGRAM(s) Oral every 6 hours PRN Mild Pain (1 - 3), Moderate Pain (4 - 6)  dextrose Oral Gel 15 Gram(s) Oral once PRN Blood Glucose LESS THAN 70 milliGRAM(s)/deciliter  hydrALAZINE Injectable 10 milliGRAM(s) IV Push every 6 hours PRN SBP >160  labetalol Injectable 10 milliGRAM(s) IV Push every 6 hours PRN SBP >160  ondansetron Injectable 4 milliGRAM(s) IV Push every 6 hours PRN Nausea and/or Vomiting     HOSPITALIST PROGRESS NOTE    LOC CAMP  [id number]  86yFemale    Patient is a 86y old  Female who presents with a chief complaint of Brain bleed (12 May 2022 14:31)      SUBJECTIVE:   Chart reviewed   Patient seen and examined at bedside earlier today for ICH.  Complained of headache - denies any dizziness, paresthesia, paresis, speech or vision changes      OBJECTIVE:  Vital Signs Last 24 Hrs  T(C): 37 (13 May 2022 12:00), Max: 37.3 (13 May 2022 04:54)  T(F): 98.6 (13 May 2022 12:00), Max: 99.1 (13 May 2022 04:54)  HR: 80 (13 May 2022 12:00) (69 - 80)  BP: 150/74 (13 May 2022 12:00) (143/75 - 150/74)   RR: 18 (13 May 2022 12:00) (18 - 18)  SpO2: 92% (13 May 2022 12:00) (92% - 93%)    PHYSICAL EXAMINATION  General: Elderly female sitting in bed, NAD  HEENT:  Right raccoon eye  NECK:  supple  CVS: regular rate and rhythm S1 S2  RESP:  CTAB  GI:  Soft nondistended nontender BS+  : No suprapubic or CVA tenderness  MSK:  Moves all extremities  CNS:  No gross focal or global deficit appreciated  INTEG:  Right eye ecchymoses  PSYCH:  Fair mood    MONITOR:  CAPILLARY BLOOD GLUCOSE      POCT Blood Glucose.: 85 mg/dL (13 May 2022 16:27)  POCT Blood Glucose.: 106 mg/dL (13 May 2022 11:10)  POCT Blood Glucose.: 110 mg/dL (13 May 2022 07:43)  POCT Blood Glucose.: 133 mg/dL (12 May 2022 23:01)        I&O's Summary                      Culture:    TTE:  < from: TTE Echo Complete w/ Contrast w/ Doppler (05.08.22 @ 09:24) >    Summary:   1. Technically difficult study.   2. Endocardial visualization was enhanced with intravenous echo contrast.   3. Hyperdynamic global left ventricular systolic function.   4. Left ventricular ejection fraction, by visual estimation, is >75%.   5. Spectral Doppler shows impaired relaxation pattern of left   ventricular myocardial filling (Grade I diastolic dysfunction).   6. Normal right ventricular size and function.   7. Trace mitral valve regurgitation.   8. Mild aortic regurgitation.   9. There is no evidence of pericardial effusion.    MD Jona Electronically signed on 5/8/2022 at 12:01:42 PM    < end of copied text >      RADIOLOGY    < from: CT Chest w/ IV Cont (05.07.22 @ 17:10) >    IMPRESSION:  No acute posttraumatic injury in the chest, abdomen or pelvis.    No acute fractures.    Ancillary nonemergent findings as above.    < end of copied text >      < from: CT Angio Brain Stroke Protocol  w/ IV Cont (05.07.22 @ 18:51) >  IMPRESSION:    Negative CTP. No large vessel occlusion. Nondisplaced right parietal   calvarial fracture. Left temporal lobe and left tentorial leaflet   subdural hemorrhages. Posterior fossa subdural hemorrhage,as above. Thin   left occipital convexity subdural hemorrhage. Thin right temporal   convexity subdural hemorrhage. Thin left frontal convexity subdural   hemorrhage. Findings discussed with physician assistant Debra Moon.    < end of copied text >      < from: US Duplex Carotid Arteries Complete, Bilateral (05.09.22 @ 17:08) >    IMPRESSION: No significant hemodynamic stenosis of either carotid artery.  Atherosclerosis of the carotid arteries is present.    Measurement of carotid stenosis is based on velocity parameters that   correlate the residual internal carotid diameter with that of the more   distal vessel in accordance with a method such as the North American   Symptomatic Carotid Endarterectomy Trial (NASCET).    --- End of Report ---      < end of copied text >    < from: MR Cervical Spine No Cont (05.09.22 @ 13:37) >  MPRESSION: Prominent cervical lordosis. No acute fractures or   dislocations. No evidence of ligamentous injury.    --- End of Report ---    < end of copied text >    < from: CT Head No Cont (05.13.22 @ 09:47) >    IMPRESSION: No significant change in small bilateral temporal subdural   hematomas and left anterior temporal hemorrhagic contusion compared with   5/8/2022.    --- End of Report ---    < end of copied text >        MEDICATIONS  (STANDING):  amLODIPine   Tablet 5 milliGRAM(s) Oral daily  aspirin  chewable 81 milliGRAM(s) Oral daily  atorvastatin 10 milliGRAM(s) Oral at bedtime  dextrose 5%. 1000 milliLiter(s) (50 mL/Hr) IV Continuous <Continuous>  dextrose 50% Injectable 25 Gram(s) IV Push once  enoxaparin Injectable 40 milliGRAM(s) SubCutaneous every 24 hours  glucagon  Injectable 1 milliGRAM(s) IntraMuscular once  insulin lispro (ADMELOG) corrective regimen sliding scale   SubCutaneous Before meals and at bedtime  levETIRAcetam  IVPB 500 milliGRAM(s) IV Intermittent every 12 hours  losartan 100 milliGRAM(s) Oral daily  metoprolol succinate ER 25 milliGRAM(s) Oral daily      MEDICATIONS  (PRN):  acetaminophen     Tablet .. 650 milliGRAM(s) Oral every 6 hours PRN Mild Pain (1 - 3), Moderate Pain (4 - 6)  dextrose Oral Gel 15 Gram(s) Oral once PRN Blood Glucose LESS THAN 70 milliGRAM(s)/deciliter  hydrALAZINE Injectable 10 milliGRAM(s) IV Push every 6 hours PRN SBP >160  labetalol Injectable 10 milliGRAM(s) IV Push every 6 hours PRN SBP >160  ondansetron Injectable 4 milliGRAM(s) IV Push every 6 hours PRN Nausea and/or Vomiting     HOSPITALIST PROGRESS NOTE    LOC CAMP  [id number]  86yFemale    Patient is a 86y old  Female who presents with a chief complaint of Brain bleed (12 May 2022 14:31)      SUBJECTIVE:   Chart reviewed   Patient seen and examined at bedside earlier today for ICH.  Complained of headache - denies any dizziness, paresthesia, paresis, speech or vision changes      OBJECTIVE:  Vital Signs Last 24 Hrs  T(C): 37 (13 May 2022 12:00), Max: 37.3 (13 May 2022 04:54)  T(F): 98.6 (13 May 2022 12:00), Max: 99.1 (13 May 2022 04:54)  HR: 80 (13 May 2022 12:00) (69 - 80)  BP: 150/74 (13 May 2022 12:00) (143/75 - 150/74)   RR: 18 (13 May 2022 12:00) (18 - 18)  SpO2: 92% (13 May 2022 12:00) (92% - 93%)    PHYSICAL EXAMINATION  General: Elderly female sitting in bed, NAD  HEENT:  Right raccoon eye  NECK:  supple  CVS: regular rate and rhythm S1 S2  RESP:  CTAB  GI:  Soft nondistended nontender BS+  : No suprapubic or CVA tenderness  MSK:  Moves all extremities  CNS:  No gross focal or global deficit appreciated  INTEG:  Right eye ecchymoses  PSYCH:  Fair mood    MONITOR:  CAPILLARY BLOOD GLUCOSE      POCT Blood Glucose.: 85 mg/dL (13 May 2022 16:27)  POCT Blood Glucose.: 106 mg/dL (13 May 2022 11:10)  POCT Blood Glucose.: 110 mg/dL (13 May 2022 07:43)  POCT Blood Glucose.: 133 mg/dL (12 May 2022 23:01)        I&O's Summary                      Culture:    TTE:  < from: TTE Echo Complete w/ Contrast w/ Doppler (05.08.22 @ 09:24) >    Summary:   1. Technically difficult study.   2. Endocardial visualization was enhanced with intravenous echo contrast.   3. Hyperdynamic global left ventricular systolic function.   4. Left ventricular ejection fraction, by visual estimation, is >75%.   5. Spectral Doppler shows impaired relaxation pattern of left   ventricular myocardial filling (Grade I diastolic dysfunction).   6. Normal right ventricular size and function.   7. Trace mitral valve regurgitation.   8. Mild aortic regurgitation.   9. There is no evidence of pericardial effusion.    MD Jona Electronically signed on 5/8/2022 at 12:01:42 PM    < end of copied text >      RADIOLOGY    < from: CT Chest w/ IV Cont (05.07.22 @ 17:10) >    IMPRESSION:  No acute posttraumatic injury in the chest, abdomen or pelvis.    No acute fractures.    Ancillary nonemergent findings as above.    < end of copied text >    < from: MR Cervical Spine No Cont (05.09.22 @ 13:37) >  IMPRESSION: Prominent cervical lordosis. No acute fractures or   dislocations. No evidence of ligamentous injury.    --- End of Report ---    < end of copied text >        < from: CT Angio Brain Stroke Protocol  w/ IV Cont (05.07.22 @ 18:51) >  IMPRESSION:    Negative CTP. No large vessel occlusion. Nondisplaced right parietal   calvarial fracture. Left temporal lobe and left tentorial leaflet   subdural hemorrhages. Posterior fossa subdural hemorrhage,as above. Thin   left occipital convexity subdural hemorrhage. Thin right temporal   convexity subdural hemorrhage. Thin left frontal convexity subdural   hemorrhage. Findings discussed with physician assistant Debra Moon.    < end of copied text >      < from: US Duplex Carotid Arteries Complete, Bilateral (05.09.22 @ 17:08) >    IMPRESSION: No significant hemodynamic stenosis of either carotid artery.  Atherosclerosis of the carotid arteries is present.    Measurement of carotid stenosis is based on velocity parameters that   correlate the residual internal carotid diameter with that of the more   distal vessel in accordance with a method such as the North American   Symptomatic Carotid Endarterectomy Trial (NASCET).    --- End of Report ---      < end of copied text >    < from: MR Cervical Spine No Cont (05.09.22 @ 13:37) >  MPRESSION: Prominent cervical lordosis. No acute fractures or   dislocations. No evidence of ligamentous injury.    --- End of Report ---    < end of copied text >    < from: CT Head No Cont (05.13.22 @ 09:47) >    IMPRESSION: No significant change in small bilateral temporal subdural   hematomas and left anterior temporal hemorrhagic contusion compared with   5/8/2022.    --- End of Report ---    < end of copied text >        MEDICATIONS  (STANDING):  amLODIPine   Tablet 5 milliGRAM(s) Oral daily  aspirin  chewable 81 milliGRAM(s) Oral daily  atorvastatin 10 milliGRAM(s) Oral at bedtime  dextrose 5%. 1000 milliLiter(s) (50 mL/Hr) IV Continuous <Continuous>  dextrose 50% Injectable 25 Gram(s) IV Push once  enoxaparin Injectable 40 milliGRAM(s) SubCutaneous every 24 hours  glucagon  Injectable 1 milliGRAM(s) IntraMuscular once  insulin lispro (ADMELOG) corrective regimen sliding scale   SubCutaneous Before meals and at bedtime  levETIRAcetam  IVPB 500 milliGRAM(s) IV Intermittent every 12 hours  losartan 100 milliGRAM(s) Oral daily  metoprolol succinate ER 25 milliGRAM(s) Oral daily      MEDICATIONS  (PRN):  acetaminophen     Tablet .. 650 milliGRAM(s) Oral every 6 hours PRN Mild Pain (1 - 3), Moderate Pain (4 - 6)  dextrose Oral Gel 15 Gram(s) Oral once PRN Blood Glucose LESS THAN 70 milliGRAM(s)/deciliter  hydrALAZINE Injectable 10 milliGRAM(s) IV Push every 6 hours PRN SBP >160  labetalol Injectable 10 milliGRAM(s) IV Push every 6 hours PRN SBP >160  ondansetron Injectable 4 milliGRAM(s) IV Push every 6 hours PRN Nausea and/or Vomiting

## 2022-05-13 NOTE — DISCHARGE NOTE NURSING/CASE MANAGEMENT/SOCIAL WORK - PATIENT PORTAL LINK FT
You can access the FollowMyHealth Patient Portal offered by [facility] by registering at the following website: [email or web address]. By joining [facility]’s FollowMyHealth portal, you will also be able to view your health information using other applications (apps) compatible with our system.

## 2022-05-23 PROBLEM — E78.5 HYPERLIPIDEMIA, UNSPECIFIED: Chronic | Status: ACTIVE | Noted: 2022-05-07

## 2022-05-23 PROBLEM — I10 ESSENTIAL (PRIMARY) HYPERTENSION: Chronic | Status: ACTIVE | Noted: 2022-05-07

## 2022-05-27 ENCOUNTER — APPOINTMENT (OUTPATIENT)
Dept: CT IMAGING | Facility: CLINIC | Age: 87
End: 2022-05-27
Payer: MEDICARE

## 2022-05-27 ENCOUNTER — APPOINTMENT (OUTPATIENT)
Dept: NEUROSURGERY | Facility: CLINIC | Age: 87
End: 2022-05-27
Payer: MEDICARE

## 2022-05-27 ENCOUNTER — OUTPATIENT (OUTPATIENT)
Dept: OUTPATIENT SERVICES | Facility: HOSPITAL | Age: 87
LOS: 1 days | End: 2022-05-27
Payer: MEDICARE

## 2022-05-27 VITALS
TEMPERATURE: 98 F | OXYGEN SATURATION: 98 % | HEART RATE: 73 BPM | DIASTOLIC BLOOD PRESSURE: 82 MMHG | SYSTOLIC BLOOD PRESSURE: 136 MMHG

## 2022-05-27 DIAGNOSIS — Z98.890 OTHER SPECIFIED POSTPROCEDURAL STATES: Chronic | ICD-10-CM

## 2022-05-27 DIAGNOSIS — I62.9 NONTRAUMATIC INTRACRANIAL HEMORRHAGE, UNSPECIFIED: ICD-10-CM

## 2022-05-27 DIAGNOSIS — Z96.659 PRESENCE OF UNSPECIFIED ARTIFICIAL KNEE JOINT: Chronic | ICD-10-CM

## 2022-05-27 PROCEDURE — G1004: CPT

## 2022-05-27 PROCEDURE — 70450 CT HEAD/BRAIN W/O DYE: CPT | Mod: 26,MG

## 2022-05-27 PROCEDURE — 70450 CT HEAD/BRAIN W/O DYE: CPT | Mod: MG

## 2022-05-27 PROCEDURE — 99213 OFFICE O/P EST LOW 20 MIN: CPT

## 2022-05-27 NOTE — REASON FOR VISIT
[Follow-Up: _____] : a [unfilled] follow-up visit [FreeTextEntry1] : Discharge Date	13-May-2022 \par Admission Date	07-May-2022 17:50 \par Reason for Admission	Brain bleed \par Hospital Course	 \par 85 yo female with a pmhx of HTN and Dyslipidemia transferred from [facility] after \par reported fall and head trauma with resultant SDH and contusion on initial CT \par Head. \par CTA head and neck without any aneurysms. CT Perfusion reported as normal \par \par He was admitted to NSICU for monitoring as repeat CT head read as some \par worsening in small hemorrhagic contusion. Third CT Head read as stable from \par second. Exam remains intact. Continues to complain of neck pain when C collar \par removed,  MRI C-spine without any fracture or dislocation. \par \par CT brain show large right-sided scalp hematoma, Acute subdural hematomas are \par again noted in the left temporal-posterior  frontal, left parietal-occipital, \par left tentorial, and left posterior, A left posterior fossa subdural \par hemato-hygroma versus an arachnoid cyst containing layering hemorrhage , A \par focal hemorrhage in the left anterior temporal region is unchanged.  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 Grossly stable multifocal \par intracranial hemorrhages. \par Repeat CT Head (5/13/22) without any new changes \par Patient presents accompanied by her daughter Erica  in no acute distress.  She admits to frontal headaches that come and go.  Denies any nausea or vomiting.  NO vision changes.  She has not had any seizures. She is currently on Keppra 500 mg BID for seizure prophylaxis.  She is ambulating with her rolling walker.  Her scalp hematoma has improved.  She has not resumed her aspirin as of yet. No new falls.

## 2022-05-27 NOTE — ASSESSMENT
[FreeTextEntry1] : Ms. Costa presents with above history and imaging.   She is neurologically intact.  Patient has complaints of headaches.   She will obtain a CT head w/o contrast to assess ICH. She has a incidental arachnoid cyst.  \par Plan:\par f/u with PMD Dr. Garcia \par F/u with cardiology\par May discontinue Keppra 500 mg BID from a neurosurgical perspective \par Maintain fall precautions.\par Patient has been given an opportunity to ask and have their questions answered to their satisfaction.\par Patient knows to call the office if there are any new or worsening symptoms.\par \par \par \par

## 2022-05-27 NOTE — ADDENDUM
[FreeTextEntry1] : \par Previously noted subdural hematomas are less conspicuous which is compatible expected evolutionary changes. Previously noted contusion involving the left temporal region is also less conspicuous, this is also compatible with expected evolutionary changes.\par \par Parenchymal volume loss and chronic microvascular ischemic changes are again seen.\par \par Left-sided arachnoid cyst is again seen in the posterior fossa which appears unchanged.\par \par No significant shift or herniation is seen\par \par Evaluation of the osseous structures with the appropriate window appears unremarkable\par \par The visualized paranasal sinuses mastoid and middle ear regions appear clear.\par \par IMPRESSION: Previously noted subdural hematoma and contusions have demonstrated expected evolutionary changes.\par Discussed with daughter Erica\par Incidental arachnoid cyst.  Monitor yearly.\par Resume aspirin 81 mg daily\par Discontinue Keppra 500 mg BID, they will discuss with Dr. Garcia

## 2022-05-27 NOTE — PHYSICAL EXAM
[General Appearance - Alert] : alert [General Appearance - In No Acute Distress] : in no acute distress [Oriented To Time, Place, And Person] : oriented to person, place, and time [Impaired Insight] : insight and judgment were intact [Affect] : the affect was normal [Person] : oriented to person [Place] : oriented to place [Time] : oriented to time [Short Term Intact] : short term memory intact [Remote Intact] : remote memory intact [Span Intact] : the attention span was normal [Concentration Intact] : normal concentrating ability [Fluency] : fluency intact [Comprehension] : comprehension intact [Current Events] : adequate knowledge of current events [Past History] : adequate knowledge of personal past history [Vocabulary] : adequate range of vocabulary [Cranial Nerves Optic (II)] : visual acuity intact bilaterally,  pupils equal round and reactive to light [Cranial Nerves Oculomotor (III)] : extraocular motion intact [Cranial Nerves Trigeminal (V)] : facial sensation intact symmetrically [Cranial Nerves Facial (VII)] : face symmetrical [Cranial Nerves Vestibulocochlear (VIII)] : hearing was intact bilaterally [Cranial Nerves Glossopharyngeal (IX)] : tongue and palate midline [Cranial Nerves Accessory (XI - Cranial And Spinal)] : head turning and shoulder shrug symmetric [Cranial Nerves Hypoglossal (XII)] : there was no tongue deviation with protrusion [Motor Tone] : muscle tone was normal in all four extremities [Motor Strength] : muscle strength was normal in all four extremities [No Muscle Atrophy] : normal bulk in all four extremities [Sensation Tactile Decrease] : light touch was intact [Abnormal Walk] : normal gait [Balance] : balance was intact [2+] : Patella left 2+ [No Visual Abnormalities] : no visible abnormailities [Past-pointing] : there was no past-pointing [Tremor] : no tremor present

## 2022-06-14 ENCOUNTER — APPOINTMENT (OUTPATIENT)
Dept: CARDIOLOGY | Facility: CLINIC | Age: 87
End: 2022-06-14

## 2022-06-14 VITALS
BODY MASS INDEX: 21.33 KG/M2 | DIASTOLIC BLOOD PRESSURE: 92 MMHG | RESPIRATION RATE: 14 BRPM | WEIGHT: 128 LBS | TEMPERATURE: 96.9 F | HEART RATE: 64 BPM | HEIGHT: 65 IN | OXYGEN SATURATION: 95 % | SYSTOLIC BLOOD PRESSURE: 164 MMHG

## 2022-06-14 DIAGNOSIS — G93.0 CEREBRAL CYSTS: ICD-10-CM

## 2022-06-14 PROCEDURE — 99214 OFFICE O/P EST MOD 30 MIN: CPT

## 2022-06-14 RX ORDER — LEVETIRACETAM 500 MG/1
500 TABLET, FILM COATED ORAL TWICE DAILY
Refills: 0 | Status: ACTIVE | COMMUNITY

## 2022-06-14 RX ORDER — MONTELUKAST 10 MG/1
10 TABLET, FILM COATED ORAL
Refills: 0 | Status: DISCONTINUED | COMMUNITY
End: 2022-06-14

## 2022-06-14 RX ORDER — ASPIRIN ENTERIC COATED TABLETS 81 MG 81 MG/1
81 TABLET, DELAYED RELEASE ORAL DAILY
Refills: 0 | Status: DISCONTINUED | COMMUNITY
End: 2022-06-14

## 2022-06-14 RX ORDER — AMLODIPINE BESYLATE 2.5 MG/1
2.5 TABLET ORAL
Qty: 90 | Refills: 0 | Status: DISCONTINUED | COMMUNITY
Start: 2018-11-05 | End: 2022-06-14

## 2022-06-14 RX ORDER — LORATADINE 5 MG/5 ML
10 SOLUTION, ORAL ORAL
Refills: 0 | Status: DISCONTINUED | COMMUNITY
End: 2022-06-14

## 2022-06-14 NOTE — HISTORY OF PRESENT ILLNESS
[FreeTextEntry1] : Sarah is a 87-year-old female with history of hypertension, PVCs, hypercholesteremia, anxiety.\par \par She had a fall and head trauma with resultant SDH and contusion on the initial CT of the head at [place] (outside hospital ).  The patient had syncope.  She does not remember the events.  It is unclear if she had syncope secondary to head trauma \par \par In her hospitalization, she had telemetry monitoring which did not show any significant arrhythmia.  Echocardiogram showed normal LV function and wall motion.  Carotid ultrasound did not show any significant disease.  Nuclear stress test was read as normal SPECT scan.  Labs showed normal A1c, normal TSH.  Lipid profile was normal with LDL of 72.\par \par Serial CAT scan showed stable findings.  She was admitted to NS ICU.  \par \par In the past: Her EKG in late August 2018 showed PVCs . Patient was asymptomatic for it.  Holter recording in October 2018 showed multifocal and frequent PVCs (5K ) but she had no history of palpitations\par \par History of hypercholesteremia on  statins she has been tolerating\par \par \par \par

## 2022-06-14 NOTE — PHYSICAL EXAM
[General Appearance - Well Developed] : well developed [Normal Appearance] : normal appearance [Well Groomed] : well groomed [General Appearance - Well Nourished] : well nourished [No Deformities] : no deformities [General Appearance - In No Acute Distress] : no acute distress [Normal Conjunctiva] : the conjunctiva exhibited no abnormalities [Eyelids - No Xanthelasma] : the eyelids demonstrated no xanthelasmas [Normal Oral Mucosa] : normal oral mucosa [No Oral Pallor] : no oral pallor [No Oral Cyanosis] : no oral cyanosis [Respiration, Rhythm And Depth] : normal respiratory rhythm and effort [Exaggerated Use Of Accessory Muscles For Inspiration] : no accessory muscle use [Auscultation Breath Sounds / Voice Sounds] : lungs were clear to auscultation bilaterally [Heart Rate And Rhythm] : heart rate and rhythm were normal [Murmurs] : no murmurs present [Heart Sounds] : normal S1 and S2 [Abdomen Soft] : soft [Abdomen Tenderness] : non-tender [Abnormal Walk] : normal gait [Gait - Sufficient For Exercise Testing] : the gait was sufficient for exercise testing [Nail Clubbing] : no clubbing of the fingernails [Cyanosis, Localized] : no localized cyanosis [Petechial Hemorrhages (___cm)] : no petechial hemorrhages [Skin Color & Pigmentation] : normal skin color and pigmentation [Skin Turgor] : normal skin turgor [] : no rash [Oriented To Time, Place, And Person] : oriented to person, place, and time [Affect] : the affect was normal [Mood] : the mood was normal [No Anxiety] : not feeling anxious [FreeTextEntry1] : No Carotid bruit. No JVD

## 2022-06-14 NOTE — ASSESSMENT
[FreeTextEntry1] : Review today\par - EKG 08/29/18: Sinus rhythm. PVC in bigeminy. Anterior fascicular block. Incomplete right bundle.\par - EKG indicating: Sinus rhythm. PVC. Anterior fascicular block. Unchanged.\par - Echocardiogram October 2018: Normal LV function. Left atrium 4.6. Ascending aorta 4.0. PASP is 36. Large sigmoid septum 2.2 cm. Moderate MR.\par - Rosa  stress test  SPECT October 2018: Most likely normal SPECT perfusion\par -Reviewed labs, notes, nuclear stress test, echo, carotid ultrasound at [facility] in May 2022

## 2022-06-14 NOTE — DISCUSSION/SUMMARY
[FreeTextEntry1] : - Hypertension: Uncontrolled. Continue current medications.  The patient just had blood pressure reading at Dr. Garcia's office which was normal.  I have asked her to go home, relax and take another blood pressure reading.  If systolic blood pressure is more than 140, she will take 2.5 mg of amlodipine which was just recently discontinued.  Keep home blood pressure log.  Patient and daughter understand this.  Follow-up in the office for hypertension.  However, she is unable to come before few weeks\par \par Target blood pressure 130-145/80 \par \par Compliance to the medication and diet is essential. Risks of noncompliance where discussed.\par \par Discussed findings on carotid ultrasound, echo, nuclear stress test and telemetry monitoring.\par \par Thank you for this referral and allowing me to participate in the care of this patient.  If I can be of any further help or  if you have any questions, please do not hesitate to contact me\par \par \par Sincerely,\par \par Alex Hyaes MD, FACC, JOSSELINE

## 2022-07-19 ENCOUNTER — APPOINTMENT (OUTPATIENT)
Dept: CARDIOLOGY | Facility: CLINIC | Age: 87
End: 2022-07-19

## 2022-10-10 ENCOUNTER — APPOINTMENT (OUTPATIENT)
Dept: OPHTHALMOLOGY | Facility: CLINIC | Age: 87
End: 2022-10-10

## 2022-12-19 ENCOUNTER — NON-APPOINTMENT (OUTPATIENT)
Age: 87
End: 2022-12-19

## 2023-02-02 ENCOUNTER — APPOINTMENT (OUTPATIENT)
Dept: OPHTHALMOLOGY | Facility: CLINIC | Age: 88
End: 2023-02-02
Payer: MEDICARE

## 2023-02-02 ENCOUNTER — NON-APPOINTMENT (OUTPATIENT)
Age: 88
End: 2023-02-02

## 2023-02-02 PROCEDURE — 92014 COMPRE OPH EXAM EST PT 1/>: CPT

## 2023-02-02 PROCEDURE — 92134 CPTRZ OPH DX IMG PST SGM RTA: CPT

## 2023-04-12 NOTE — ED PROVIDER NOTE - OBJECTIVE STATEMENT
Pt is a 85 yo F co fall.  Pt had a fall today with loc. Pt was taken to [facility] and was found to have a SDH and was sent here for trauma/NSG evaluation. Pt co headache. no other complaints.
Patient/Caregiver provided printed discharge information.

## 2023-11-20 ENCOUNTER — APPOINTMENT (OUTPATIENT)
Dept: OPHTHALMOLOGY | Facility: CLINIC | Age: 88
End: 2023-11-20

## 2024-05-07 ENCOUNTER — APPOINTMENT (OUTPATIENT)
Dept: CARDIOLOGY | Facility: CLINIC | Age: 89
End: 2024-05-07
Payer: MEDICARE

## 2024-05-07 ENCOUNTER — NON-APPOINTMENT (OUTPATIENT)
Age: 89
End: 2024-05-07

## 2024-05-07 VITALS
DIASTOLIC BLOOD PRESSURE: 92 MMHG | WEIGHT: 135 LBS | BODY MASS INDEX: 22.49 KG/M2 | OXYGEN SATURATION: 95 % | HEIGHT: 65 IN | RESPIRATION RATE: 14 BRPM | HEART RATE: 69 BPM | SYSTOLIC BLOOD PRESSURE: 144 MMHG

## 2024-05-07 PROCEDURE — G2211 COMPLEX E/M VISIT ADD ON: CPT

## 2024-05-07 PROCEDURE — 93000 ELECTROCARDIOGRAM COMPLETE: CPT

## 2024-05-07 PROCEDURE — 99214 OFFICE O/P EST MOD 30 MIN: CPT

## 2024-05-07 RX ORDER — LOSARTAN POTASSIUM 50 MG/1
50 TABLET, FILM COATED ORAL DAILY
Refills: 0 | Status: ACTIVE | COMMUNITY

## 2024-05-07 NOTE — DISCUSSION/SUMMARY
[FreeTextEntry1] : - Hypertension: Uncontrolled.  Increase metoprolol to 25 twice daily.   Keep home blood pressure log.  Patient and daughter understand this.  Follow-up in the office for hypertension.  Target blood pressure 130-145/80   Nonpharmacologic ways of reducing blood pressure were discussed.  Check echocardiogram.  Follow-up after above  Thank you for this referral and allowing me to participate in the care of this patient.  If I can be of any further help or  if you have any questions, please do not hesitate to contact me   Sincerely,  Alex Hayes MD, FACC, JOSSELINE

## 2024-05-07 NOTE — HISTORY OF PRESENT ILLNESS
[FreeTextEntry1] : Sarah is a 88-year-old female with history of hypertension, PVCs, hypercholesteremia, anxiety.  She had a fall and head trauma with resultant SDH and contusion on the initial CT of the head at [place] (outside hospital ).  The patient had syncope.  During her hospitalization, telemetry and echocardiogram were unremarkable. Carotid ultrasound did not show any significant disease.  Nuclear stress test was read as normal SPECT scan.  Labs showed normal A1c, normal TSH.  Lipid profile was normal with LDL of 72.  Holter recording in October 2018 showed multifocal and frequent PVCs (5K ) but she had no history of palpitations  History of hypercholesteremia on  statins she has been tolerating  The past 2 to 3 weeks, she has complained of exertional shortness of breath.  No chest discomfort.  No palpitations.  No PND, orthopnea or pedal edema.  Her BNP level was normal on labs on 5/2/2024.

## 2024-05-07 NOTE — PHYSICAL EXAM
[General Appearance - Well Developed] : well developed [Normal Appearance] : normal appearance [Well Groomed] : well groomed [General Appearance - Well Nourished] : well nourished [No Deformities] : no deformities [General Appearance - In No Acute Distress] : no acute distress [Normal Conjunctiva] : the conjunctiva exhibited no abnormalities [Eyelids - No Xanthelasma] : the eyelids demonstrated no xanthelasmas [Normal Oral Mucosa] : normal oral mucosa [No Oral Pallor] : no oral pallor [No Oral Cyanosis] : no oral cyanosis [FreeTextEntry1] : No Carotid bruit. No JVD  [Respiration, Rhythm And Depth] : normal respiratory rhythm and effort [Exaggerated Use Of Accessory Muscles For Inspiration] : no accessory muscle use [Auscultation Breath Sounds / Voice Sounds] : lungs were clear to auscultation bilaterally [Heart Rate And Rhythm] : heart rate and rhythm were normal [Heart Sounds] : normal S1 and S2 [Murmurs] : no murmurs present [Abdomen Soft] : soft [Abdomen Tenderness] : non-tender [Abnormal Walk] : normal gait [Gait - Sufficient For Exercise Testing] : the gait was sufficient for exercise testing [Nail Clubbing] : no clubbing of the fingernails [Cyanosis, Localized] : no localized cyanosis [Petechial Hemorrhages (___cm)] : no petechial hemorrhages [Skin Color & Pigmentation] : normal skin color and pigmentation [Skin Turgor] : normal skin turgor [] : no rash [Oriented To Time, Place, And Person] : oriented to person, place, and time [Affect] : the affect was normal [Mood] : the mood was normal [No Anxiety] : not feeling anxious

## 2024-06-04 ENCOUNTER — APPOINTMENT (OUTPATIENT)
Dept: CARDIOLOGY | Facility: CLINIC | Age: 89
End: 2024-06-04
Payer: MEDICARE

## 2024-06-04 VITALS
HEART RATE: 60 BPM | OXYGEN SATURATION: 97 % | DIASTOLIC BLOOD PRESSURE: 84 MMHG | SYSTOLIC BLOOD PRESSURE: 132 MMHG | RESPIRATION RATE: 14 BRPM | HEIGHT: 65 IN | WEIGHT: 136 LBS | BODY MASS INDEX: 22.66 KG/M2

## 2024-06-04 DIAGNOSIS — I49.3 VENTRICULAR PREMATURE DEPOLARIZATION: ICD-10-CM

## 2024-06-04 DIAGNOSIS — I62.9 NONTRAUMATIC INTRACRANIAL HEMORRHAGE, UNSPECIFIED: ICD-10-CM

## 2024-06-04 DIAGNOSIS — I10 ESSENTIAL (PRIMARY) HYPERTENSION: ICD-10-CM

## 2024-06-04 DIAGNOSIS — E78.5 HYPERLIPIDEMIA, UNSPECIFIED: ICD-10-CM

## 2024-06-04 DIAGNOSIS — F41.9 ANXIETY DISORDER, UNSPECIFIED: ICD-10-CM

## 2024-06-04 DIAGNOSIS — R06.02 SHORTNESS OF BREATH: ICD-10-CM

## 2024-06-04 PROCEDURE — 93306 TTE W/DOPPLER COMPLETE: CPT

## 2024-06-04 PROCEDURE — 99214 OFFICE O/P EST MOD 30 MIN: CPT

## 2024-06-04 PROCEDURE — G2211 COMPLEX E/M VISIT ADD ON: CPT

## 2024-06-04 RX ORDER — METOPROLOL SUCCINATE 25 MG/1
25 TABLET, EXTENDED RELEASE ORAL TWICE DAILY
Refills: 0 | Status: ACTIVE | COMMUNITY

## 2024-06-04 NOTE — PHYSICAL EXAM
[General Appearance - Well Developed] : well developed [Normal Appearance] : normal appearance [Well Groomed] : well groomed [General Appearance - Well Nourished] : well nourished [No Deformities] : no deformities [General Appearance - In No Acute Distress] : no acute distress [Normal Conjunctiva] : the conjunctiva exhibited no abnormalities [Eyelids - No Xanthelasma] : the eyelids demonstrated no xanthelasmas [Normal Oral Mucosa] : normal oral mucosa [No Oral Pallor] : no oral pallor [No Oral Cyanosis] : no oral cyanosis [Respiration, Rhythm And Depth] : normal respiratory rhythm and effort [Exaggerated Use Of Accessory Muscles For Inspiration] : no accessory muscle use [Auscultation Breath Sounds / Voice Sounds] : lungs were clear to auscultation bilaterally [Heart Rate And Rhythm] : heart rate and rhythm were normal [Heart Sounds] : normal S1 and S2 [Murmurs] : no murmurs present [Abdomen Soft] : soft [Abdomen Tenderness] : non-tender [Abnormal Walk] : normal gait [Gait - Sufficient For Exercise Testing] : the gait was sufficient for exercise testing [Nail Clubbing] : no clubbing of the fingernails [Cyanosis, Localized] : no localized cyanosis [Petechial Hemorrhages (___cm)] : no petechial hemorrhages [Skin Color & Pigmentation] : normal skin color and pigmentation [Skin Turgor] : normal skin turgor [] : no rash [Affect] : the affect was normal [Oriented To Time, Place, And Person] : oriented to person, place, and time [Mood] : the mood was normal [No Anxiety] : not feeling anxious [FreeTextEntry1] : No Carotid bruit. No JVD

## 2024-06-04 NOTE — ASSESSMENT
[FreeTextEntry1] : Review today\par  - EKG 08/29/18: Sinus rhythm. PVC in bigeminy. Anterior fascicular block. Incomplete right bundle.\par  - EKG indicating: Sinus rhythm. PVC. Anterior fascicular block. Unchanged.\par  - Echocardiogram October 2018: Normal LV function. Left atrium 4.6. Ascending aorta 4.0. PASP is 36. Large sigmoid septum 2.2 cm. Moderate MR.\par  - Rosa  stress test  SPECT October 2018: Most likely normal SPECT perfusion\par  -Reviewed labs, notes, nuclear stress test, echo, carotid ultrasound at [facility] in May 2022

## 2024-06-04 NOTE — HISTORY OF PRESENT ILLNESS
[FreeTextEntry1] : Sarah is a 89-year-old female with history of hypertension, PVCs, hypercholesteremia, anxiety.  She had a fall and head trauma with resultant SDH and contusion on the initial CT of the head at [place] (outside hospital ).  The patient had syncope.  She does not remember the events.  It is unclear if she had syncope secondary to head trauma   In her hospitalization, she had telemetry monitoring which did not show any significant arrhythmia.  Echocardiogram showed normal LV function and wall motion.  Carotid ultrasound did not show any significant disease.  Nuclear stress test was read as normal SPECT scan.  Labs showed normal A1c, normal TSH.  Lipid profile was normal with LDL of 72.  Serial CAT scan showed stable findings.  She was admitted to [facility].    In the past: Her EKG in late August 2018 showed PVCs . Patient was asymptomatic for it.  Holter recording in October 2018 showed multifocal and frequent PVCs (5K ) but she had no history of palpitations  History of hypercholesteremia on  statins she has been tolerating

## 2024-08-22 ENCOUNTER — NON-APPOINTMENT (OUTPATIENT)
Age: 89
End: 2024-08-22

## 2024-08-22 ENCOUNTER — APPOINTMENT (OUTPATIENT)
Dept: OPHTHALMOLOGY | Facility: CLINIC | Age: 89
End: 2024-08-22
Payer: MEDICARE

## 2024-08-22 PROCEDURE — 99213 OFFICE O/P EST LOW 20 MIN: CPT

## 2024-08-22 PROCEDURE — 92134 CPTRZ OPH DX IMG PST SGM RTA: CPT

## 2024-09-24 NOTE — OCCUPATIONAL THERAPY INITIAL EVALUATION ADULT - LEVEL OF INDEPENDENCE: SIT/STAND, REHAB EVAL
Render In Strict Bullet Format?: No Continue Regimen: Clobetasol shampoo Detail Level: Simple Plan: Continue clobetasol shampoo mon, wed and fri for prn when flared minimum assist (75% patients effort)

## 2025-02-04 ENCOUNTER — NON-APPOINTMENT (OUTPATIENT)
Age: 89
End: 2025-02-04

## 2025-02-04 ENCOUNTER — APPOINTMENT (OUTPATIENT)
Dept: OPHTHALMOLOGY | Facility: CLINIC | Age: 89
End: 2025-02-04
Payer: MEDICARE

## 2025-02-04 PROCEDURE — 99213 OFFICE O/P EST LOW 20 MIN: CPT

## 2025-02-18 ENCOUNTER — APPOINTMENT (OUTPATIENT)
Dept: CARDIOLOGY | Facility: CLINIC | Age: 89
End: 2025-02-18
Payer: MEDICARE

## 2025-02-18 ENCOUNTER — NON-APPOINTMENT (OUTPATIENT)
Age: 89
End: 2025-02-18

## 2025-02-18 VITALS
DIASTOLIC BLOOD PRESSURE: 92 MMHG | HEART RATE: 62 BPM | SYSTOLIC BLOOD PRESSURE: 140 MMHG | BODY MASS INDEX: 22.63 KG/M2 | OXYGEN SATURATION: 95 % | WEIGHT: 136 LBS

## 2025-02-18 DIAGNOSIS — I49.3 VENTRICULAR PREMATURE DEPOLARIZATION: ICD-10-CM

## 2025-02-18 DIAGNOSIS — R06.02 SHORTNESS OF BREATH: ICD-10-CM

## 2025-02-18 DIAGNOSIS — I10 ESSENTIAL (PRIMARY) HYPERTENSION: ICD-10-CM

## 2025-02-18 DIAGNOSIS — E78.5 HYPERLIPIDEMIA, UNSPECIFIED: ICD-10-CM

## 2025-02-18 LAB — A1CG - A1C WITH ESTIMATED AVERAGE GLUCOSE: 5.6

## 2025-02-18 PROCEDURE — 99213 OFFICE O/P EST LOW 20 MIN: CPT

## 2025-04-24 ENCOUNTER — APPOINTMENT (OUTPATIENT)
Dept: OPHTHALMOLOGY | Facility: CLINIC | Age: 89
End: 2025-04-24
Payer: MEDICARE

## 2025-04-24 ENCOUNTER — NON-APPOINTMENT (OUTPATIENT)
Age: 89
End: 2025-04-24

## 2025-04-24 PROCEDURE — 92134 CPTRZ OPH DX IMG PST SGM RTA: CPT

## 2025-04-24 PROCEDURE — 92014 COMPRE OPH EXAM EST PT 1/>: CPT

## 2025-05-16 ENCOUNTER — RESULT REVIEW (OUTPATIENT)
Age: 89
End: 2025-05-16

## 2025-05-17 ENCOUNTER — TRANSCRIPTION ENCOUNTER (OUTPATIENT)
Age: 89
End: 2025-05-17

## 2025-05-30 ENCOUNTER — APPOINTMENT (OUTPATIENT)
Dept: CARDIOLOGY | Facility: CLINIC | Age: 89
End: 2025-05-30
Payer: MEDICARE

## 2025-05-30 VITALS
OXYGEN SATURATION: 95 % | DIASTOLIC BLOOD PRESSURE: 56 MMHG | HEART RATE: 85 BPM | BODY MASS INDEX: 20.83 KG/M2 | HEIGHT: 65 IN | WEIGHT: 125 LBS | SYSTOLIC BLOOD PRESSURE: 112 MMHG

## 2025-05-30 DIAGNOSIS — I26.99 OTHER PULMONARY EMBOLISM W/OUT ACUTE COR PULMONALE: ICD-10-CM

## 2025-05-30 DIAGNOSIS — I10 ESSENTIAL (PRIMARY) HYPERTENSION: ICD-10-CM

## 2025-05-30 DIAGNOSIS — I82.409 ACUTE EMBOLISM AND THROMBOSIS OF UNSPECIFIED DEEP VEINS OF UNSPECIFIED LOWER EXTREMITY: ICD-10-CM

## 2025-05-30 DIAGNOSIS — E78.5 HYPERLIPIDEMIA, UNSPECIFIED: ICD-10-CM

## 2025-05-30 PROCEDURE — 99204 OFFICE O/P NEW MOD 45 MIN: CPT

## 2025-05-30 PROCEDURE — G2211 COMPLEX E/M VISIT ADD ON: CPT

## 2025-05-30 RX ORDER — APIXABAN 2.5 MG/1
2.5 TABLET, FILM COATED ORAL
Qty: 180 | Refills: 2 | Status: ACTIVE | COMMUNITY
Start: 2025-05-30 | End: 1900-01-01

## 2025-05-30 RX ORDER — APIXABAN 2.5 MG/1
2.5 TABLET, FILM COATED ORAL
Qty: 180 | Refills: 2 | Status: COMPLETED | COMMUNITY
Start: 1900-01-01 | End: 2025-05-30

## 2025-06-17 ENCOUNTER — LABORATORY RESULT (OUTPATIENT)
Age: 89
End: 2025-06-17

## 2025-06-19 ENCOUNTER — OUTPATIENT (OUTPATIENT)
Dept: OUTPATIENT SERVICES | Facility: HOSPITAL | Age: 89
LOS: 1 days | End: 2025-06-19
Payer: MEDICARE

## 2025-06-19 DIAGNOSIS — Z96.659 PRESENCE OF UNSPECIFIED ARTIFICIAL KNEE JOINT: Chronic | ICD-10-CM

## 2025-06-19 DIAGNOSIS — Z98.890 OTHER SPECIFIED POSTPROCEDURAL STATES: Chronic | ICD-10-CM

## 2025-06-19 DIAGNOSIS — I82.40 ACUTE EMBOLISM AND THROMBOSIS OF UNSPECIFIED DEEP VEINS OF LOWER EXTREMITY: ICD-10-CM

## 2025-06-24 ENCOUNTER — APPOINTMENT (OUTPATIENT)
Dept: HEMATOLOGY ONCOLOGY | Facility: CLINIC | Age: 89
End: 2025-06-24
Payer: MEDICARE

## 2025-06-24 ENCOUNTER — TRANSCRIPTION ENCOUNTER (OUTPATIENT)
Age: 89
End: 2025-06-24

## 2025-06-24 ENCOUNTER — NON-APPOINTMENT (OUTPATIENT)
Age: 89
End: 2025-06-24

## 2025-06-24 ENCOUNTER — RESULT REVIEW (OUTPATIENT)
Age: 89
End: 2025-06-24

## 2025-06-24 VITALS
HEIGHT: 65 IN | HEART RATE: 73 BPM | TEMPERATURE: 98 F | OXYGEN SATURATION: 96 % | DIASTOLIC BLOOD PRESSURE: 79 MMHG | BODY MASS INDEX: 20.83 KG/M2 | WEIGHT: 125 LBS | SYSTOLIC BLOOD PRESSURE: 130 MMHG

## 2025-06-24 LAB
BASOPHILS # BLD AUTO: 0.02 K/UL — SIGNIFICANT CHANGE UP (ref 0–0.2)
BASOPHILS NFR BLD AUTO: 0.4 % — SIGNIFICANT CHANGE UP (ref 0–2)
EOSINOPHIL # BLD AUTO: 0.05 K/UL — SIGNIFICANT CHANGE UP (ref 0–0.5)
EOSINOPHIL NFR BLD AUTO: 1.1 % — SIGNIFICANT CHANGE UP (ref 0–6)
HCT VFR BLD CALC: 39.5 % — SIGNIFICANT CHANGE UP (ref 34.5–45)
HGB BLD-MCNC: 11.8 G/DL — SIGNIFICANT CHANGE UP (ref 11.5–15.5)
IMM GRANULOCYTES # BLD AUTO: 0.02 K/UL — SIGNIFICANT CHANGE UP (ref 0–0.07)
IMM GRANULOCYTES NFR BLD AUTO: 0.4 % — SIGNIFICANT CHANGE UP (ref 0–0.9)
LYMPHOCYTES # BLD AUTO: 0.73 K/UL — LOW (ref 1–3.3)
LYMPHOCYTES NFR BLD AUTO: 15.5 % — SIGNIFICANT CHANGE UP (ref 13–44)
MCHC RBC-ENTMCNC: 27.5 PG — SIGNIFICANT CHANGE UP (ref 27–34)
MCHC RBC-ENTMCNC: 29.9 G/DL — LOW (ref 32–36)
MCV RBC AUTO: 92.1 FL — SIGNIFICANT CHANGE UP (ref 80–100)
MONOCYTES # BLD AUTO: 0.29 K/UL — SIGNIFICANT CHANGE UP (ref 0–0.9)
MONOCYTES NFR BLD AUTO: 6.1 % — SIGNIFICANT CHANGE UP (ref 2–14)
NEUTROPHILS # BLD AUTO: 3.61 K/UL — SIGNIFICANT CHANGE UP (ref 1.8–7.4)
NEUTROPHILS NFR BLD AUTO: 76.5 % — SIGNIFICANT CHANGE UP (ref 43–77)
NRBC # BLD AUTO: 0 K/UL — SIGNIFICANT CHANGE UP (ref 0–0)
NRBC # FLD: 0 K/UL — SIGNIFICANT CHANGE UP (ref 0–0)
NRBC BLD AUTO-RTO: 0 /100 WBCS — SIGNIFICANT CHANGE UP (ref 0–0)
PLATELET # BLD AUTO: 277 K/UL — SIGNIFICANT CHANGE UP (ref 150–400)
PMV BLD: 10.3 FL — SIGNIFICANT CHANGE UP (ref 7–13)
RBC # BLD: 4.29 M/UL — SIGNIFICANT CHANGE UP (ref 3.8–5.2)
RBC # FLD: 14.7 % — HIGH (ref 10.3–14.5)
WBC # BLD: 4.72 K/UL — SIGNIFICANT CHANGE UP (ref 3.8–10.5)
WBC # FLD AUTO: 4.72 K/UL — SIGNIFICANT CHANGE UP (ref 3.8–10.5)

## 2025-06-24 PROCEDURE — 85025 COMPLETE CBC W/AUTO DIFF WBC: CPT

## 2025-06-24 PROCEDURE — G2211 COMPLEX E/M VISIT ADD ON: CPT

## 2025-06-24 PROCEDURE — 99204 OFFICE O/P NEW MOD 45 MIN: CPT

## 2025-06-25 LAB
DEPRECATED D DIMER PPP IA-ACNC: 548 NG/ML DDU
FERRITIN SERPL-MCNC: 88 NG/ML
IRON SATN MFR SERPL: 17 %
IRON SERPL-MCNC: 52 UG/DL
TIBC SERPL-MCNC: 297 UG/DL
TRANSFERRIN SERPL-MCNC: 234 MG/DL
UIBC SERPL-MCNC: 246 UG/DL

## 2025-06-30 ENCOUNTER — NON-APPOINTMENT (OUTPATIENT)
Age: 89
End: 2025-06-30

## 2025-06-30 LAB
DNA PLOIDY SPEC FC-IMP: NORMAL
PTR INTERP: NORMAL

## 2025-07-23 ENCOUNTER — APPOINTMENT (OUTPATIENT)
Dept: ULTRASOUND IMAGING | Facility: CLINIC | Age: 89
End: 2025-07-23
Payer: MEDICARE

## 2025-07-23 PROCEDURE — 93971 EXTREMITY STUDY: CPT | Mod: RT

## 2025-07-31 ENCOUNTER — APPOINTMENT (OUTPATIENT)
Dept: CARDIOLOGY | Facility: CLINIC | Age: 89
End: 2025-07-31
Payer: MEDICARE

## 2025-07-31 VITALS
HEART RATE: 66 BPM | OXYGEN SATURATION: 92 % | WEIGHT: 113 LBS | BODY MASS INDEX: 18.83 KG/M2 | HEIGHT: 65 IN | DIASTOLIC BLOOD PRESSURE: 62 MMHG | SYSTOLIC BLOOD PRESSURE: 118 MMHG

## 2025-07-31 DIAGNOSIS — I62.9 NONTRAUMATIC INTRACRANIAL HEMORRHAGE, UNSPECIFIED: ICD-10-CM

## 2025-07-31 DIAGNOSIS — I26.99 OTHER PULMONARY EMBOLISM W/OUT ACUTE COR PULMONALE: ICD-10-CM

## 2025-07-31 DIAGNOSIS — I10 ESSENTIAL (PRIMARY) HYPERTENSION: ICD-10-CM

## 2025-07-31 DIAGNOSIS — K21.9 GASTRO-ESOPHAGEAL REFLUX DISEASE W/OUT ESOPHAGITIS: ICD-10-CM

## 2025-07-31 DIAGNOSIS — I82.409 ACUTE EMBOLISM AND THROMBOSIS OF UNSPECIFIED DEEP VEINS OF UNSPECIFIED LOWER EXTREMITY: ICD-10-CM

## 2025-07-31 DIAGNOSIS — E78.5 HYPERLIPIDEMIA, UNSPECIFIED: ICD-10-CM

## 2025-07-31 DIAGNOSIS — Z91.89 OTHER SPECIFIED PERSONAL RISK FACTORS, NOT ELSEWHERE CLASSIFIED: ICD-10-CM

## 2025-07-31 DIAGNOSIS — I49.3 VENTRICULAR PREMATURE DEPOLARIZATION: ICD-10-CM

## 2025-07-31 PROCEDURE — G2211 COMPLEX E/M VISIT ADD ON: CPT

## 2025-07-31 PROCEDURE — 99215 OFFICE O/P EST HI 40 MIN: CPT

## 2025-07-31 RX ORDER — FAMOTIDINE 10 MG/1
TABLET, FILM COATED ORAL
Refills: 0 | Status: DISCONTINUED | COMMUNITY

## 2025-07-31 RX ORDER — PANTOPRAZOLE 20 MG/1
20 TABLET, DELAYED RELEASE ORAL
Qty: 60 | Refills: 2 | Status: ACTIVE | COMMUNITY
Start: 2025-07-31 | End: 1900-01-01